# Patient Record
Sex: FEMALE | Race: WHITE | URBAN - METROPOLITAN AREA
[De-identification: names, ages, dates, MRNs, and addresses within clinical notes are randomized per-mention and may not be internally consistent; named-entity substitution may affect disease eponyms.]

---

## 2019-01-08 LAB — PAP SMEAR, EXTERNAL: NEGATIVE

## 2020-11-10 ENCOUNTER — NURSE ONLY (OUTPATIENT)
Dept: PRIMARY CARE CLINIC | Age: 25
End: 2020-11-10

## 2020-11-10 PROCEDURE — 99211 OFF/OP EST MAY X REQ PHY/QHP: CPT | Performed by: NURSE PRACTITIONER

## 2020-11-12 LAB — SARS-COV-2, NAA: DETECTED

## 2020-11-25 ENCOUNTER — VIRTUAL VISIT (OUTPATIENT)
Dept: PRIMARY CARE CLINIC | Age: 25
End: 2020-11-25
Payer: COMMERCIAL

## 2020-11-25 PROCEDURE — 99203 OFFICE O/P NEW LOW 30 MIN: CPT | Performed by: FAMILY MEDICINE

## 2020-11-25 RX ORDER — FERROUS SULFATE 325(65) MG
325 TABLET ORAL
COMMUNITY
End: 2022-09-21

## 2020-11-25 RX ORDER — DROSPIRENONE AND ETHINYL ESTRADIOL 0.02-3(28)
1 KIT ORAL DAILY
COMMUNITY
Start: 2020-07-02 | End: 2022-08-29

## 2020-11-25 SDOH — HEALTH STABILITY: MENTAL HEALTH: HOW OFTEN DO YOU HAVE A DRINK CONTAINING ALCOHOL?: 2-4 TIMES A MONTH

## 2020-11-25 ASSESSMENT — ENCOUNTER SYMPTOMS
CONSTIPATION: 0
COUGH: 0
ABDOMINAL PAIN: 0
DIARRHEA: 0
EYE PAIN: 0

## 2020-11-25 NOTE — PROGRESS NOTES
2020    TELEHEALTH EVALUATION -- Audio/Visual (During UPBTJ-88 public health emergency)    HPI:    Tarah Ge (:  1995) has requested an audio/video evaluation for the following concern(s):    COVID detected on 11/10/2020. Asymptomatic now. Only SOB lingering when active. HR is jumping all over the place on her Apple Watch. She is watching it diligently. No associated symptoms with low or elevated HR. States that HR respond appropriately when low. Mentioned anxiety being a cause but did not speak about it much. Review of Systems   Constitutional: Negative for appetite change, chills, fatigue and fever. HENT: Negative for congestion. Eyes: Negative for pain and visual disturbance. Respiratory: Negative for cough. Cardiovascular: Positive for palpitations. Negative for chest pain. Gastrointestinal: Negative for abdominal pain, constipation and diarrhea. Genitourinary: Negative for difficulty urinating. Musculoskeletal: Negative for arthralgias. Skin: Negative for rash and wound. Neurological: Negative for dizziness, weakness, light-headedness and headaches. Hematological: Does not bruise/bleed easily. Psychiatric/Behavioral: Negative for behavioral problems. Prior to Visit Medications    Medication Sig Taking? Authorizing Provider   drospirenone-ethinyl estradiol (ADDISON) 3-0.02 MG per tablet Take 1 tablet by mouth daily Yes Historical Provider, MD   ferrous sulfate (IRON 325) 325 (65 Fe) MG tablet Take 325 mg by mouth daily (with breakfast) Yes Historical Provider, MD       Social History     Tobacco Use    Smoking status: Never Smoker   Substance Use Topics    Alcohol use: Yes     Frequency: 2-4 times a month    Drug use: No        No Known Allergies, History reviewed. No pertinent past medical history. ,   Past Surgical History:   Procedure Laterality Date    KNEE SURGERY  2012    left    KNEE SURGERY Right      and 2017    WISDOM TOOTH EXTRACTION     ,   Family History   Problem Relation Age of Onset    High Blood Pressure Mother     No Known Problems Father        PHYSICAL EXAMINATION:  [ INSTRUCTIONS:  \"[x]\" Indicates a positive item  \"[]\" Indicates a negative item  -- DELETE ALL ITEMS NOT EXAMINED]  [x] Alert  [x] Oriented to person/place/time    [x] No apparent distress  [] Toxic appearing    [] Face flushed appearing [x] Sclera clear  [] Lips are cyanotic      [x] Breathing appears normal  [] Appears tachypneic      [] Rash on visible skin    [x] Cranial Nerves II-XII grossly intact    [] Motor grossly intact in visible upper extremities    [] Motor grossly intact in visible lower extremities    [x] Normal Mood  [x] Anxious appearing    [x] Depressed appearing  [] Confused appearing      Due to this being a TeleHealth encounter, evaluation of the following organ systems is limited: Vitals/Constitutional/EENT/Resp/CV/GI//MS/Neuro/Skin/Heme-Lymph-Imm. ASSESSMENT/PLAN:  1. Encounter to establish care  All questions answered. F/u discussed. Healthy lifestyle modifications discussed. 2. Iron deficiency anemia, unspecified iron deficiency anemia type  Taking iron daily  Was told that she was low on iron in the past and recently started supplementing without follow up. Will update labs and treat accordingly. - Ferritin; Future  - Iron and TIBC; Future  - Transferrin; Future  - CBC Auto Differential; Future  - Comprehensive Metabolic Panel; Future    3. Increased heart rate  Continue to monitor. If anxiety is playing a lg part, will follow up to speak about this   Denies assoc symptoms. No follow-ups on file. No future appointments. An  electronic signature was used to authenticate this note. --Luis Fernando Delgadillo MD on 11/25/2020 at 6:35 PM    {Coding Help -- Use CPT 40616-22770 with EM elements or Time rules for Office Visits. :    16-20 or more minutes were spent on the digital evaluation and management of this patient. Pursuant to the emergency declaration under the [facility], [facility] waiver authority and the [facility] and [employer] Act, this Virtual  Visit was conducted, with patient's consent, to reduce the patient's risk of exposure to COVID-19 and provide continuity of care for an established patient. Services were provided through a video synchronous discussion virtually to substitute for in-person clinic visit.

## 2020-12-03 DIAGNOSIS — D50.9 IRON DEFICIENCY ANEMIA, UNSPECIFIED IRON DEFICIENCY ANEMIA TYPE: ICD-10-CM

## 2020-12-03 LAB
A/G RATIO: 1.9 (ref 1.1–2.2)
ALBUMIN SERPL-MCNC: 4.9 G/DL (ref 3.4–5)
ALP BLD-CCNC: 40 U/L (ref 40–129)
ALT SERPL-CCNC: 8 U/L (ref 10–40)
ANION GAP SERPL CALCULATED.3IONS-SCNC: 13 MMOL/L (ref 3–16)
AST SERPL-CCNC: 17 U/L (ref 15–37)
BASOPHILS ABSOLUTE: 0 K/UL (ref 0–0.2)
BASOPHILS RELATIVE PERCENT: 0.5 %
BILIRUB SERPL-MCNC: 0.8 MG/DL (ref 0–1)
BUN BLDV-MCNC: 8 MG/DL (ref 7–20)
CALCIUM SERPL-MCNC: 9.7 MG/DL (ref 8.3–10.6)
CHLORIDE BLD-SCNC: 99 MMOL/L (ref 99–110)
CO2: 25 MMOL/L (ref 21–32)
CREAT SERPL-MCNC: 0.6 MG/DL (ref 0.6–1.1)
EOSINOPHILS ABSOLUTE: 0 K/UL (ref 0–0.6)
EOSINOPHILS RELATIVE PERCENT: 0.4 %
FERRITIN: 77.9 NG/ML (ref 15–150)
GFR AFRICAN AMERICAN: >60
GFR NON-AFRICAN AMERICAN: >60
GLOBULIN: 2.6 G/DL
GLUCOSE BLD-MCNC: 103 MG/DL (ref 70–99)
HCT VFR BLD CALC: 42.8 % (ref 36–48)
HEMOGLOBIN: 14.8 G/DL (ref 12–16)
IRON SATURATION: 46 % (ref 15–50)
IRON: 169 UG/DL (ref 37–145)
LYMPHOCYTES ABSOLUTE: 1.2 K/UL (ref 1–5.1)
LYMPHOCYTES RELATIVE PERCENT: 19.5 %
MCH RBC QN AUTO: 34 PG (ref 26–34)
MCHC RBC AUTO-ENTMCNC: 34.6 G/DL (ref 31–36)
MCV RBC AUTO: 98 FL (ref 80–100)
MONOCYTES ABSOLUTE: 0.4 K/UL (ref 0–1.3)
MONOCYTES RELATIVE PERCENT: 5.9 %
NEUTROPHILS ABSOLUTE: 4.7 K/UL (ref 1.7–7.7)
NEUTROPHILS RELATIVE PERCENT: 73.7 %
PDW BLD-RTO: 11.5 % (ref 12.4–15.4)
PLATELET # BLD: 247 K/UL (ref 135–450)
PMV BLD AUTO: 8.1 FL (ref 5–10.5)
POTASSIUM SERPL-SCNC: 4.1 MMOL/L (ref 3.5–5.1)
RBC # BLD: 4.36 M/UL (ref 4–5.2)
SODIUM BLD-SCNC: 137 MMOL/L (ref 136–145)
TOTAL IRON BINDING CAPACITY: 370 UG/DL (ref 260–445)
TOTAL PROTEIN: 7.5 G/DL (ref 6.4–8.2)
TRANSFERRIN: 318 MG/DL (ref 200–360)
WBC # BLD: 6.4 K/UL (ref 4–11)

## 2021-12-02 ENCOUNTER — E-VISIT (OUTPATIENT)
Dept: FAMILY MEDICINE CLINIC | Age: 26
End: 2021-12-02
Payer: COMMERCIAL

## 2021-12-02 DIAGNOSIS — L50.9 URTICARIA: Primary | ICD-10-CM

## 2021-12-02 PROCEDURE — 99422 OL DIG E/M SVC 11-20 MIN: CPT | Performed by: FAMILY MEDICINE

## 2021-12-02 RX ORDER — FAMOTIDINE 40 MG/1
40 TABLET, FILM COATED ORAL EVERY EVENING
Qty: 30 TABLET | Refills: 0 | Status: SHIPPED | OUTPATIENT
Start: 2021-12-02 | End: 2022-08-29 | Stop reason: ALTCHOICE

## 2021-12-02 RX ORDER — PREDNISONE 20 MG/1
40 TABLET ORAL DAILY
Qty: 10 TABLET | Refills: 0 | Status: SHIPPED | OUTPATIENT
Start: 2021-12-02 | End: 2021-12-07

## 2021-12-02 RX ORDER — HYDROXYZINE HYDROCHLORIDE 25 MG/1
25 TABLET, FILM COATED ORAL EVERY 8 HOURS PRN
Qty: 30 TABLET | Refills: 0 | Status: SHIPPED | OUTPATIENT
Start: 2021-12-02 | End: 2021-12-12

## 2021-12-02 NOTE — PROGRESS NOTES
The above-named patient has requested evaluation and management services through an electronic visit by way of WeShow powered by [employer] and provided by [facility] [place]. The history of present illness provided by the patient as well as medications, allergies, past medical history have been reviewed in this electronic health record. Following evaluation and management recommendations have been made and communicated to the patient via WeShow:    Kuldip Napoles. I am sorry you are having issues with this rash. It could be related to exposure or contact to an allergen or irritant. Or it could be occurring spontaneously. I have prescribed prednisone, famotidine and a strong antihistamine. In the meantime, please schedule an appointment with your physician. I believe you may need to see an allergist.    Diagnoses and all orders for this visit:    Urticaria  Comments:  See above. Orders:  -     hydrOXYzine (ATARAX) 25 MG tablet; Take 1 tablet by mouth every 8 hours as needed for Itching  -     predniSONE (DELTASONE) 20 MG tablet; Take 2 tablets by mouth daily for 5 days  -     famotidine (PEPCID) 40 MG tablet; Take 1 tablet by mouth every evening      11-20 minutes were spent on the digital evaluation and management of this patient.

## 2021-12-06 ENCOUNTER — VIRTUAL VISIT (OUTPATIENT)
Dept: PRIMARY CARE CLINIC | Age: 26
End: 2021-12-06
Payer: COMMERCIAL

## 2021-12-06 DIAGNOSIS — R53.83 OTHER FATIGUE: ICD-10-CM

## 2021-12-06 DIAGNOSIS — Z86.2 HISTORY OF ANEMIA: ICD-10-CM

## 2021-12-06 DIAGNOSIS — L50.9 HIVES: Primary | ICD-10-CM

## 2021-12-06 LAB
BASOPHILS ABSOLUTE: 0 K/UL (ref 0–0.2)
BASOPHILS RELATIVE PERCENT: 0.2 %
EOSINOPHILS ABSOLUTE: 0 K/UL (ref 0–0.6)
EOSINOPHILS RELATIVE PERCENT: 0 %
HCT VFR BLD CALC: 41 % (ref 36–48)
HEMOGLOBIN: 13.9 G/DL (ref 12–16)
LYMPHOCYTES ABSOLUTE: 1.3 K/UL (ref 1–5.1)
LYMPHOCYTES RELATIVE PERCENT: 15.3 %
MCH RBC QN AUTO: 33.7 PG (ref 26–34)
MCHC RBC AUTO-ENTMCNC: 34 G/DL (ref 31–36)
MCV RBC AUTO: 99.2 FL (ref 80–100)
MONOCYTES ABSOLUTE: 0.5 K/UL (ref 0–1.3)
MONOCYTES RELATIVE PERCENT: 5.8 %
NEUTROPHILS ABSOLUTE: 6.7 K/UL (ref 1.7–7.7)
NEUTROPHILS RELATIVE PERCENT: 78.7 %
PDW BLD-RTO: 12.1 % (ref 12.4–15.4)
PLATELET # BLD: 274 K/UL (ref 135–450)
PMV BLD AUTO: 7.9 FL (ref 5–10.5)
RBC # BLD: 4.13 M/UL (ref 4–5.2)
WBC # BLD: 8.5 K/UL (ref 4–11)

## 2021-12-06 PROCEDURE — 99214 OFFICE O/P EST MOD 30 MIN: CPT | Performed by: FAMILY MEDICINE

## 2021-12-06 PROCEDURE — VIRTUALHLTH VIRTUAL HEALTH SAME DAY: Performed by: FAMILY MEDICINE

## 2021-12-06 RX ORDER — CETIRIZINE HYDROCHLORIDE 10 MG/1
10 TABLET ORAL DAILY
Qty: 90 TABLET | Refills: 1 | Status: SHIPPED | OUTPATIENT
Start: 2021-12-06 | End: 2022-08-29 | Stop reason: SDUPTHER

## 2021-12-06 ASSESSMENT — ENCOUNTER SYMPTOMS
SHORTNESS OF BREATH: 0
DIARRHEA: 0
CONSTIPATION: 0
EYE PAIN: 0
ABDOMINAL PAIN: 0
COUGH: 0

## 2021-12-06 ASSESSMENT — PATIENT HEALTH QUESTIONNAIRE - PHQ9
SUM OF ALL RESPONSES TO PHQ QUESTIONS 1-9: 0
SUM OF ALL RESPONSES TO PHQ9 QUESTIONS 1 & 2: 0
1. LITTLE INTEREST OR PLEASURE IN DOING THINGS: 0
SUM OF ALL RESPONSES TO PHQ QUESTIONS 1-9: 0
SUM OF ALL RESPONSES TO PHQ QUESTIONS 1-9: 0
2. FEELING DOWN, DEPRESSED OR HOPELESS: 0

## 2021-12-06 NOTE — PROGRESS NOTES
2021    TELEHEALTH EVALUATION -- Audio/Visual (During HBOTX-84 public health emergency)    HPI:    Anusha Gale (:  1995) has requested an audio/video evaluation for the following concern(s):    Rash last week. Started on her neck and enveloped her cheeks and chest.  Was itchy, red and mildly raised. She describes them as large hives. E-visit completed and a DrAbdirahman Prescribed: Famotidine, Atarax and steroid  She did not have time to pick this up, the rash worsened and she ended up going to the urgent care. Told her to start taking the meds prescribed during a visit  After starting these medications, the rash dissipated over the weekend and now gone. Has not returned. She was wondering if it had anything to do with cleaning products at work or a new substance used. She has not introduced anything new into her daily routine at home. She has returned to work since and has not had any symptoms. Has been tired lately and in the setting of history of anemia, would like blood work updated. Review of Systems   Constitutional: Positive for fatigue. Negative for appetite change, chills and fever. HENT: Negative for congestion. Eyes: Negative for pain and visual disturbance. Respiratory: Negative for cough and shortness of breath. Cardiovascular: Negative for chest pain and palpitations. Gastrointestinal: Negative for abdominal pain, constipation and diarrhea. Genitourinary: Negative for difficulty urinating. Musculoskeletal: Negative for arthralgias. Skin: Positive for rash. Negative for wound. Neurological: Negative for dizziness, weakness, light-headedness and headaches. Hematological: Does not bruise/bleed easily. Psychiatric/Behavioral: Negative for behavioral problems. Prior to Visit Medications    Medication Sig Taking?  Authorizing Provider   cetirizine (ZYRTEC) 10 MG tablet Take 1 tablet by mouth daily Yes Analy Moran MD   hydrOXYzine (ATARAX) 25 MG tablet Take 1 tablet by mouth every 8 hours as needed for Itching Yes Nghia Reyna MD   predniSONE (DELTASONE) 20 MG tablet Take 2 tablets by mouth daily for 5 days Yes Nghia Reyna MD   famotidine (PEPCID) 40 MG tablet Take 1 tablet by mouth every evening Yes Nghia Reyna MD   drospirenone-ethinyl estradiol (ADDISON) 3-0.02 MG per tablet Take 1 tablet by mouth daily Yes Historical Provider, MD   ferrous sulfate (IRON 325) 325 (65 Fe) MG tablet Take 325 mg by mouth daily (with breakfast) Yes Historical Provider, MD       Social History     Tobacco Use    Smoking status: Never Smoker    Smokeless tobacco: Never Used   Vaping Use    Vaping Use: Never used   Substance Use Topics    Alcohol use: Yes    Drug use: No        No Known Allergies, History reviewed. No pertinent past medical history. ,   Past Surgical History:   Procedure Laterality Date    KNEE SURGERY  01/01/2012    left    KNEE SURGERY Right     2015 and 2017    WISDOM TOOTH EXTRACTION     ,   Social History     Tobacco Use    Smoking status: Never Smoker    Smokeless tobacco: Never Used   Vaping Use    Vaping Use: Never used   Substance Use Topics    Alcohol use:  Yes    Drug use: No   ,   Family History   Problem Relation Age of Onset    High Blood Pressure Mother     No Known Problems Father    ,   Immunization History   Administered Date(s) Administered    COVID-19, Helena Hansen, Primary or Immunocompromised, PF, 100mcg/0.5mL 12/29/2020, 01/26/2021    Influenza, Quadv, IM, PF (6 mo and older Fluzone, Flulaval, Fluarix, and 3 yrs and older Afluria) 09/05/2017, 08/17/2018    Influenza, Patt Cuna, Recombinant, IM PF (Flublok 18 yrs and older) 10/16/2019    PPD Test 09/06/2017, 09/13/2017, 08/14/2018, 08/27/2018   ,   Health Maintenance   Topic Date Due    Hepatitis C screen  Never done    Varicella vaccine (1 of 2 - 2-dose childhood series) Never done    HPV vaccine (1 - 2-dose series) Never done    HIV screen  Never done    DTaP/Tdap/Td vaccine (1 - Tdap) Never done    Pap smear  Never done    COVID-19 Vaccine (3 - Booster for Moderna series) 07/26/2021    Flu vaccine (1) 09/01/2021    Hepatitis A vaccine  Aged Out    Hepatitis B vaccine  Aged Out    Hib vaccine  Aged Out    Meningococcal (ACWY) vaccine  Aged Out    Pneumococcal 0-64 years Vaccine  Aged Out       PHYSICAL EXAMINATION:  [ INSTRUCTIONS:  \"[x]\" Indicates a positive item  \"[]\" Indicates a negative item  -- DELETE ALL ITEMS NOT EXAMINED]  [x] Alert  [x] Oriented to person/place/time    [x] No apparent distress  [] Toxic appearing    [] Face flushed appearing [x] Sclera clear  [] Lips are cyanotic      [x] Breathing appears normal  [] Appears tachypneic      [] Rash on visible skin    [x] Cranial Nerves II-XII grossly intact    [] Motor grossly intact in visible upper extremities    [] Motor grossly intact in visible lower extremities    [x] Normal Mood  [] Anxious appearing    [] Depressed appearing  [] Confused appearing      Due to this being a TeleHealth encounter, evaluation of the following organ systems is limited: Vitals/Constitutional/EENT/Resp/CV/GI//MS/Neuro/Skin/Heme-Lymph-Imm. ASSESSMENT/PLAN:  1. Hives  Referral placed to allergist per patient request  Recommended daily antihistamine; may take 20 mg for the first 2 weeks and then 10 mg daily every day thereafter. Follow-up in 4 to 6 weeks  - [facility] - Martin Gomez MD, Allergy & Immunology, [place]  - cetirizine (ZYRTEC) 10 MG tablet; Take 1 tablet by mouth daily  Dispense: 90 tablet; Refill: 1    2. Other fatigue  Update labs to rule out anemia, electrolyte abnormality, thyroid abnormality. Sleeping well and hydrating well. States she can probably eat more healthfully  Follow-up labs and treat accordingly  - CBC Auto Differential; Future  - Comprehensive Metabolic Panel; Future  - TSH with Reflex; Future  - Ferritin; Future  - Iron and TIBC;  Future  - Transferrin; Future    3. History of anemia  As above      No follow-ups on file. No future appointments. An  electronic signature was used to authenticate this note. --Laura Gooden MD on 12/6/2021 at 5:14 PM    {Coding Help -- Use CPT 13448-04212 with EM elements or Time rules for Office Visits. :    >20 minutes were spent on the digital evaluation and management of this patient. Pursuant to the emergency declaration under the [address] [place] [address], [facility] waiver authority and the [employer] and [employer] Act, this Virtual  Visit was conducted, with patient's consent, to reduce the patient's risk of exposure to COVID-19 and provide continuity of care for an established patient. Services were provided through a video synchronous discussion virtually to substitute for in-person clinic visit.

## 2021-12-07 LAB
A/G RATIO: 1.8 (ref 1.1–2.2)
ALBUMIN SERPL-MCNC: 4.8 G/DL (ref 3.4–5)
ALP BLD-CCNC: 43 U/L (ref 40–129)
ALT SERPL-CCNC: 10 U/L (ref 10–40)
ANION GAP SERPL CALCULATED.3IONS-SCNC: 17 MMOL/L (ref 3–16)
AST SERPL-CCNC: 15 U/L (ref 15–37)
BILIRUB SERPL-MCNC: 0.4 MG/DL (ref 0–1)
BUN BLDV-MCNC: 11 MG/DL (ref 7–20)
CALCIUM SERPL-MCNC: 9.6 MG/DL (ref 8.3–10.6)
CHLORIDE BLD-SCNC: 99 MMOL/L (ref 99–110)
CO2: 24 MMOL/L (ref 21–32)
CREAT SERPL-MCNC: 0.6 MG/DL (ref 0.6–1.1)
FERRITIN: 32.5 NG/ML (ref 15–150)
GFR AFRICAN AMERICAN: >60
GFR NON-AFRICAN AMERICAN: >60
GLUCOSE BLD-MCNC: 106 MG/DL (ref 70–99)
IRON SATURATION: 19 % (ref 15–50)
IRON: 81 UG/DL (ref 37–145)
POTASSIUM SERPL-SCNC: 4 MMOL/L (ref 3.5–5.1)
SODIUM BLD-SCNC: 140 MMOL/L (ref 136–145)
TOTAL IRON BINDING CAPACITY: 418 UG/DL (ref 260–445)
TOTAL PROTEIN: 7.5 G/DL (ref 6.4–8.2)
TRANSFERRIN: 368 MG/DL (ref 200–360)
TSH REFLEX: 1.42 UIU/ML (ref 0.27–4.2)

## 2022-08-15 ENCOUNTER — PATIENT MESSAGE (OUTPATIENT)
Dept: PRIMARY CARE CLINIC | Age: 27
End: 2022-08-15

## 2022-08-15 DIAGNOSIS — L50.9 URTICARIA: Primary | ICD-10-CM

## 2022-08-15 DIAGNOSIS — R20.3 SENSITIVE SKIN: ICD-10-CM

## 2022-08-29 ENCOUNTER — OFFICE VISIT (OUTPATIENT)
Dept: DERMATOLOGY | Age: 27
End: 2022-08-29
Payer: COMMERCIAL

## 2022-08-29 DIAGNOSIS — L50.8 CHRONIC URTICARIA: ICD-10-CM

## 2022-08-29 DIAGNOSIS — L24.9 IRRITANT CONTACT DERMATITIS, UNSPECIFIED TRIGGER: Primary | ICD-10-CM

## 2022-08-29 PROCEDURE — 99204 OFFICE O/P NEW MOD 45 MIN: CPT | Performed by: INTERNAL MEDICINE

## 2022-08-29 RX ORDER — HYDROXYZINE HYDROCHLORIDE 10 MG/1
TABLET, FILM COATED ORAL
Qty: 30 TABLET | Refills: 1 | Status: SHIPPED | OUTPATIENT
Start: 2022-08-29 | End: 2022-09-23 | Stop reason: SDUPTHER

## 2022-08-29 RX ORDER — CETIRIZINE HYDROCHLORIDE 10 MG/1
TABLET ORAL
Qty: 120 TABLET | Refills: 1 | Status: SHIPPED | OUTPATIENT
Start: 2022-08-29 | End: 2022-09-22

## 2022-08-29 RX ORDER — DROSPIRENONE AND ETHINYL ESTRADIOL 0.02-3(28)
KIT ORAL
COMMUNITY

## 2022-08-29 NOTE — PATIENT INSTRUCTIONS
Thank you for visiting [facility] today! Please follow the instructions below as we discussed in clinic:    1) Start taking Zyrtec (cetirizine) 10mg daily. Increase to 10mg twice a day for next week. If you're still itchy, increase to 10mg 3 times a day for a week. If you're still itchy, increase to max of 20mg in morning and 20mg at night. 2) Continue hydrocortisone cream as you need  3) Continue hydroxyzine 25mg nightly as needed for itch  4) You have prominent dermatographism    Urticaria (Hives)    Hives are a common skin condition, usually characterized by pink swollen welts that develop, move and fade within several hours. They are usually itchy. Individual welts can be circular, annular or irregularly shaped. Lesions can also arise at sites of pressure or scratching, a condition called \"dermatographism\". In most cases, a cause can not be identified, and the condition goes away as mysteriously as it starts. The most common identifiable triggers are exposure to sun, heat, cold or water. Having an illness like a cold, flu, Strep throat, or a urinary tract infection can also trigger hives. Less commonly, foods will cause hives. Hives can be categorized by the duration of the problem:  Acute - welts may come and go within several hours, but the total duration of the rash lasts 2 weeks or less. Extensive evaluation almost never identifies a cause that can be easily treated or eliminated. Chronic urticaria - welts may come and go within several hours, and the total duration of the rash lasts more than 6 weeks. In half the cases, extensive evaluation may identify a cause that can be easily eliminated or treated. For more information, the American Academy of Dermatology has developed a Chronic Urticaria charles \"AAD Hives\".   c:

## 2022-08-29 NOTE — PROGRESS NOTES
[facility]  Megha Blackmon MD  [phone]    Date of Visit: 8/29/2022    Nusrat Arthur is a 33 y.o. female who presents for chronic rash. New pt    Chief Complaint:   Chief Complaint   Patient presents with    Rash     Abdomen, worse on lower rt leg, both legs         History of Present Illness:    Concern:  Rash since after getting COVID booster vaccine (11 days after)  Location: Started on arms, back-->spread to abdomen/legs more recently  Duration:  November 2021 it started, got better in Spring 2022, but has slowly started to recur over last several months  Symptoms: Itchy; rash comes and goes on different body parts but most prominently on lower body now   Exacerbating factors: Heat makes worse, hot showers, sweating   Previous treatments:    -Steroids + hydroxyzine at the beginning--hydroxyzine made too sedating  -Famotidine in AM--didn't really take it  Current treatments:  -Cetirizine 10mg nightly  -HC cream BID PRN  Effect of current treatment: Above help, but still getting new areas  Other history:  Seen by Dr. Lucho Garibay (Allergy/Immuno) who thought hives were 2/2 COVID vaccine most likely. Offered allergy prick testing, but thought wouldn't be as helpful. Can't see notes in chart. Labs from 12/2021 including iron panel, TSH, CBC, CMP WNL    *Personal history of skin cancer: None  *Family history of skin cancer: None    Review of Systems:  Gen: Feels well, good sense of health. Skin: No new or changing moles, no history of keloids or hypertrophic scars. Past Medical History, Family History, Surgical History, Medications and Allergies reviewed. History reviewed. No pertinent past medical history.   Past Surgical History:   Procedure Laterality Date    KNEE SURGERY  01/01/2012    left    KNEE SURGERY Right     2015 and 2017    WISDOM TOOTH EXTRACTION         No Known Allergies  Outpatient Medications Marked as Taking for the 8/29/22 encounter (Office Visit) with Adia Powell Davis Fuchs MD   Medication Sig Dispense Refill    drospirenone-ethinyl estradiol (ADDISON) 3-0.02 MG per tablet       cetirizine (ZYRTEC) 10 MG tablet Take up to 4 tablets daily for chronic urticaria 120 tablet 1    hydrOXYzine HCl (ATARAX) 10 MG tablet Take 1 tablet nightly PRN for itch 30 tablet 1    ferrous sulfate (IRON 325) 325 (65 Fe) MG tablet Take 325 mg by mouth daily (with breakfast)           Physical Examination   No acute distress. Mood clear/affect appropriate. Alert and oriented. Mucous membranes moist.  Sclera anicteric. Limited body skin exam was conducted to include the scalp, face (around mask) neck, chest, abdomen, back, right and left hands and forearms, right and left leg and feet and was normal with the following exceptions:   + Dermatographism  -Wheals on abdomen, L thigh  -Perifollicular erythema on R lower leg       Assessment and Plan     1. Chronic urticaria, diffuse body involvement--not controlled since Nov 2021  -Reviewed etiology, prognosis, trmt option. Pt has had recurrent hives for > 6 weeks with 2-3 flares per week or more. Unclear trigger, but has not maximized anti-histamines  -Reviewed cause of acute/chronic urticaria is more often idiopathic   -Recommend:  -Start cetirizine (ZYRTEC) 10 MG tablet; Take up to 4 tablets daily for chronic urticaria  Dispense: 120 tablet; Refill: 1 (recommend increase by 10mg every week if still itchy with max dose of 40mg daily)  - Cont hydrOXYzine HCl (ATARAX) 10 MG tablet; Take 1 tablet nightly PRN for itch  Dispense: 30 tablet; Refill: 1  -Cont OTC HC cream BID PRN for no more than 15 days per month to 1 area    -Future considerations: Autoimmune lab work up next visit     2. Irritant contact dermatitis, unspecified trigger, R lower leg  Favor razor burn/ICD=separate process from above  -Can apply HC cream BID PRN to area + change out razor blade    Return in about 7 weeks (around 10/17/2022).     Note is transcribed using voice recognition software. Inadvertent computerized transcription errors may be present.     Airam Rosales MD

## 2022-09-19 ENCOUNTER — PATIENT MESSAGE (OUTPATIENT)
Dept: DERMATOLOGY | Age: 27
End: 2022-09-19

## 2022-09-21 ENCOUNTER — OFFICE VISIT (OUTPATIENT)
Dept: PRIMARY CARE CLINIC | Age: 27
End: 2022-09-21
Payer: COMMERCIAL

## 2022-09-21 VITALS
WEIGHT: 127.2 LBS | DIASTOLIC BLOOD PRESSURE: 74 MMHG | HEART RATE: 79 BPM | HEIGHT: 65 IN | BODY MASS INDEX: 21.19 KG/M2 | SYSTOLIC BLOOD PRESSURE: 126 MMHG

## 2022-09-21 DIAGNOSIS — Z00.00 ENCOUNTER FOR WELL ADULT EXAM WITHOUT ABNORMAL FINDINGS: Primary | ICD-10-CM

## 2022-09-21 DIAGNOSIS — D50.9 IRON DEFICIENCY ANEMIA, UNSPECIFIED IRON DEFICIENCY ANEMIA TYPE: ICD-10-CM

## 2022-09-21 PROCEDURE — 99395 PREV VISIT EST AGE 18-39: CPT | Performed by: FAMILY MEDICINE

## 2022-09-21 SDOH — ECONOMIC STABILITY: FOOD INSECURITY: WITHIN THE PAST 12 MONTHS, YOU WORRIED THAT YOUR FOOD WOULD RUN OUT BEFORE YOU GOT MONEY TO BUY MORE.: NEVER TRUE

## 2022-09-21 SDOH — ECONOMIC STABILITY: FOOD INSECURITY: WITHIN THE PAST 12 MONTHS, THE FOOD YOU BOUGHT JUST DIDN'T LAST AND YOU DIDN'T HAVE MONEY TO GET MORE.: NEVER TRUE

## 2022-09-21 ASSESSMENT — PATIENT HEALTH QUESTIONNAIRE - PHQ9
2. FEELING DOWN, DEPRESSED OR HOPELESS: 0
SUM OF ALL RESPONSES TO PHQ QUESTIONS 1-9: 0
SUM OF ALL RESPONSES TO PHQ9 QUESTIONS 1 & 2: 0
1. LITTLE INTEREST OR PLEASURE IN DOING THINGS: 0
SUM OF ALL RESPONSES TO PHQ QUESTIONS 1-9: 0

## 2022-09-21 ASSESSMENT — ENCOUNTER SYMPTOMS
CONSTIPATION: 0
SHORTNESS OF BREATH: 0
ABDOMINAL PAIN: 0
DIARRHEA: 0
EYE PAIN: 0
COUGH: 0

## 2022-09-21 ASSESSMENT — SOCIAL DETERMINANTS OF HEALTH (SDOH): HOW HARD IS IT FOR YOU TO PAY FOR THE VERY BASICS LIKE FOOD, HOUSING, MEDICAL CARE, AND HEATING?: NOT HARD AT ALL

## 2022-09-21 NOTE — PATIENT INSTRUCTIONS
Well Visit, Ages 25 to 72: Care Instructions  Well visits can help you stay healthy. Your doctor has checked your overall health and may have suggested ways to take good care of yourself. Your doctor also may have recommended tests. You can help prevent illness with healthy eating, good sleep, vaccinations, regular exercise, and other steps. Get the tests that you and your doctor decide on. Depending on your age and risks, examples might include screening for diabetes; hepatitis C; HIV; and cervical, breast, lung, and colon cancer. Screening helps find diseases before any symptoms appear. Eat healthy foods. Choose fruits, vegetables, whole grains, lean protein, and low-fat dairy foods. Limit saturated fat and reduce salt. Limit alcohol. Men should have no more than 2 drinks a day. Women should have no more than 1. For some people, no alcohol is the best choice. Exercise. Get at least 30 minutes of exercise on most days of the week. Walking can be a good choice. Reach and stay at your healthy weight. This will lower your risk for many health problems. Take care of your mental health. Try to stay connected with friends, family, and community, and find ways to manage stress. If you're feeling depressed or hopeless, talk to someone. A counselor can help. If you don't have a counselor, talk to your doctor. Talk to your doctor if you think you may have a problem with alcohol or drug use. This includes prescription medicines and illegal drugs. Avoid tobacco and nicotine: Don't smoke, vape, or chew. If you need help quitting, talk to your doctor. Practice safer sex. Getting tested, using condoms or dental dams, and limiting sex partners can help prevent STIs. Use birth control if it's important to you to prevent pregnancy. Talk with your doctor about your choices and what might be best for you. Prevent problems where you can.  Protect your skin from too much sun, wash your hands, brush your teeth twice a day, and wear a seat belt in the car. Where can you learn more? Go to [email or web address]://[email or web address].[email or web address] org and sign in to your [employer] account. Enter P072 in the [facility] box to learn more about \"Well Visit, Ages 25 to 72: Care Instructions. \"     If you do not have an account, please click on the \"Sign Up Now\" link. Current as of: March 9, 2022               Content Version: 13.4  © 4460-6665 [employer], [employer]. Care instructions adapted under license by [facility] ([facility]). If you have questions about a medical condition or this instruction, always ask your healthcare professional. [address] any warranty or liability for your use of this information.

## 2022-09-21 NOTE — PROGRESS NOTES
of wine per week    Drug use: No       New Prescriptions    No medications on file       Meds Prior to visit:  Current Outpatient Medications on File Prior to Visit   Medication Sig Dispense Refill    drospirenone-ethinyl estradiol (ADDISON) 3-0.02 MG per tablet       cetirizine (ZYRTEC) 10 MG tablet Take up to 4 tablets daily for chronic urticaria 120 tablet 1    hydrOXYzine HCl (ATARAX) 10 MG tablet Take 1 tablet nightly PRN for itch 30 tablet 1     No current facility-administered medications on file prior to visit. No Known Allergies    OBJECTIVE:  /74   Pulse 79   Ht 5' 5\" (1.651 m)   Wt 127 lb 3.2 oz (57.7 kg)   LMP 08/26/2022   BMI 21.17 kg/m²   BP Readings from Last 2 Encounters:   09/21/22 126/74     Wt Readings from Last 3 Encounters:   09/21/22 127 lb 3.2 oz (57.7 kg)       Physical Exam  Vitals reviewed. Constitutional:       General: She is not in acute distress. Appearance: Normal appearance. She is well-developed and normal weight. HENT:      Head: Normocephalic and atraumatic. Right Ear: Tympanic membrane, ear canal and external ear normal. There is no impacted cerumen. Left Ear: Tympanic membrane, ear canal and external ear normal. There is no impacted cerumen. Nose: Nose normal.      Mouth/Throat:      Mouth: Mucous membranes are moist.      Pharynx: Oropharynx is clear. No oropharyngeal exudate or posterior oropharyngeal erythema. Eyes:      General: No scleral icterus. Right eye: No discharge. Left eye: No discharge. Extraocular Movements: Extraocular movements intact. Conjunctiva/sclera: Conjunctivae normal.      Pupils: Pupils are equal, round, and reactive to light. Cardiovascular:      Rate and Rhythm: Normal rate and regular rhythm. Pulses: Normal pulses. Heart sounds: Normal heart sounds. No murmur heard.      Comments: Radial and pedal pulses intact  Pulmonary:      Effort: Pulmonary effort is normal. No respiratory distress. Breath sounds: Normal breath sounds. No wheezing or rales. Chest:      Chest wall: No tenderness. Abdominal:      General: Bowel sounds are normal.      Palpations: Abdomen is soft. There is no mass. Tenderness: There is no abdominal tenderness. There is no guarding or rebound. Hernia: No hernia is present. Comments: Normal liver and spleen. No organomegaly   Musculoskeletal:         General: No tenderness. Normal range of motion. Cervical back: Normal range of motion and neck supple. Comments: Intact in all extremities   Lymphadenopathy:      Cervical: No cervical adenopathy. Skin:     General: Skin is warm. Findings: No erythema or rash. Neurological:      General: No focal deficit present. Mental Status: She is alert and oriented to person, place, and time. Mental status is at baseline. Motor: No weakness or abnormal muscle tone. Coordination: Coordination normal.      Gait: Gait normal.      Deep Tendon Reflexes: Reflexes normal.   Psychiatric:         Mood and Affect: Mood normal.         Behavior: Behavior normal.         Thought Content: Thought content normal.         Judgment: Judgment normal.       Lab Results   Component Value Date    WBC 8.5 12/06/2021    HGB 13.9 12/06/2021    HCT 41.0 12/06/2021    MCV 99.2 12/06/2021     12/06/2021     Lab Results   Component Value Date     12/06/2021    K 4.0 12/06/2021    CL 99 12/06/2021    CO2 24 12/06/2021    BUN 11 12/06/2021    CREATININE 0.6 12/06/2021    GLUCOSE 106 (H) 12/06/2021    CALCIUM 9.6 12/06/2021    PROT 7.5 12/06/2021    LABALBU 4.8 12/06/2021    BILITOT 0.4 12/06/2021    ALKPHOS 43 12/06/2021    AST 15 12/06/2021    ALT 10 12/06/2021    LABGLOM >60 12/06/2021    GFRAA >60 12/06/2021    AGRATIO 1.8 12/06/2021    GLOB 2.6 12/03/2020       Discussed use, benefit, and side effects of prescribed medications. Barriers to medication compliance addressed.   All patient questions answered. Pt voiced understanding. RTC Return if symptoms worsen or fail to improve.     Future Appointments   Date Time Provider Val Strickland   10/10/2022  3:45 PM MD Ghulam Crespo MD  9/21/2022  4:49 PM

## 2022-09-22 DIAGNOSIS — D50.9 IRON DEFICIENCY ANEMIA, UNSPECIFIED IRON DEFICIENCY ANEMIA TYPE: ICD-10-CM

## 2022-09-22 DIAGNOSIS — Z00.00 ENCOUNTER FOR WELL ADULT EXAM WITHOUT ABNORMAL FINDINGS: ICD-10-CM

## 2022-09-22 DIAGNOSIS — L50.8 CHRONIC URTICARIA: ICD-10-CM

## 2022-09-22 LAB
A/G RATIO: 1.7 (ref 1.1–2.2)
ALBUMIN SERPL-MCNC: 4.5 G/DL (ref 3.4–5)
ALP BLD-CCNC: 48 U/L (ref 40–129)
ALT SERPL-CCNC: 13 U/L (ref 10–40)
ANION GAP SERPL CALCULATED.3IONS-SCNC: 14 MMOL/L (ref 3–16)
AST SERPL-CCNC: 21 U/L (ref 15–37)
BASOPHILS ABSOLUTE: 0 K/UL (ref 0–0.2)
BASOPHILS RELATIVE PERCENT: 0.5 %
BILIRUB SERPL-MCNC: 0.6 MG/DL (ref 0–1)
BUN BLDV-MCNC: 7 MG/DL (ref 7–20)
CALCIUM SERPL-MCNC: 9.4 MG/DL (ref 8.3–10.6)
CHLORIDE BLD-SCNC: 98 MMOL/L (ref 99–110)
CHOLESTEROL, TOTAL: 198 MG/DL (ref 0–199)
CO2: 24 MMOL/L (ref 21–32)
CREAT SERPL-MCNC: 0.7 MG/DL (ref 0.6–1.1)
EOSINOPHILS ABSOLUTE: 0 K/UL (ref 0–0.6)
EOSINOPHILS RELATIVE PERCENT: 0.9 %
GFR AFRICAN AMERICAN: >60
GFR NON-AFRICAN AMERICAN: >60
GLUCOSE BLD-MCNC: 85 MG/DL (ref 70–99)
HCT VFR BLD CALC: 38.4 % (ref 36–48)
HDLC SERPL-MCNC: 72 MG/DL (ref 40–60)
HEMOGLOBIN: 13 G/DL (ref 12–16)
LDL CHOLESTEROL CALCULATED: 108 MG/DL
LYMPHOCYTES ABSOLUTE: 1.8 K/UL (ref 1–5.1)
LYMPHOCYTES RELATIVE PERCENT: 34 %
MCH RBC QN AUTO: 32.7 PG (ref 26–34)
MCHC RBC AUTO-ENTMCNC: 34 G/DL (ref 31–36)
MCV RBC AUTO: 96.4 FL (ref 80–100)
MONOCYTES ABSOLUTE: 0.4 K/UL (ref 0–1.3)
MONOCYTES RELATIVE PERCENT: 8.4 %
NEUTROPHILS ABSOLUTE: 3 K/UL (ref 1.7–7.7)
NEUTROPHILS RELATIVE PERCENT: 56.2 %
PDW BLD-RTO: 13 % (ref 12.4–15.4)
PLATELET # BLD: 267 K/UL (ref 135–450)
PMV BLD AUTO: 7.8 FL (ref 5–10.5)
POTASSIUM SERPL-SCNC: 3.9 MMOL/L (ref 3.5–5.1)
RBC # BLD: 3.98 M/UL (ref 4–5.2)
SODIUM BLD-SCNC: 136 MMOL/L (ref 136–145)
TOTAL PROTEIN: 7.2 G/DL (ref 6.4–8.2)
TRIGL SERPL-MCNC: 91 MG/DL (ref 0–150)
VLDLC SERPL CALC-MCNC: 18 MG/DL
WBC # BLD: 5.3 K/UL (ref 4–11)

## 2022-09-22 RX ORDER — CETIRIZINE HYDROCHLORIDE 10 MG/1
TABLET ORAL
Qty: 120 TABLET | Refills: 1 | Status: SHIPPED | OUTPATIENT
Start: 2022-09-22 | End: 2022-10-21 | Stop reason: SDUPTHER

## 2022-09-23 ENCOUNTER — TELEPHONE (OUTPATIENT)
Dept: DERMATOLOGY | Age: 27
End: 2022-09-23

## 2022-09-23 RX ORDER — HYDROXYZINE HYDROCHLORIDE 10 MG/1
TABLET, FILM COATED ORAL
Qty: 30 TABLET | Refills: 0 | Status: SHIPPED | OUTPATIENT
Start: 2022-09-23 | End: 2022-10-21 | Stop reason: SDUPTHER

## 2022-09-23 NOTE — TELEPHONE ENCOUNTER
Called pt. [phone]  [facility] to call office  A PA is need for medication hydroxyzine and we are needing you prescription card information.   Please return call to office  Thanks Tessie Oliveira

## 2022-09-27 NOTE — TELEPHONE ENCOUNTER
Called pt [phone]  [facility] to call office need prescription card information for medication hydroxyzine or checking to see if you had picked it up already with a good rx coupon   Please return call to office [phone]  Thanks Oniel Jefferson

## 2022-10-04 NOTE — TELEPHONE ENCOUNTER
Call #3   Called pt [phone]  To see if patient picked up medication from pharm with a good rx coupon

## 2022-10-10 ENCOUNTER — OFFICE VISIT (OUTPATIENT)
Dept: DERMATOLOGY | Age: 27
End: 2022-10-10
Payer: COMMERCIAL

## 2022-10-10 DIAGNOSIS — L50.8 CHRONIC URTICARIA: Primary | ICD-10-CM

## 2022-10-10 PROCEDURE — 99214 OFFICE O/P EST MOD 30 MIN: CPT | Performed by: INTERNAL MEDICINE

## 2022-10-10 NOTE — PROGRESS NOTES
[facility]  Cammie Bautista MD  [phone]    Date of Visit: 10/10/2022  LV 8/2022    Raya Burnett is a 33 y.o. female who presents for chronic rash. Chief Complaint:   Chief Complaint   Patient presents with    Rash     Follow up rash- gone now        History of Present Illness:    Concern: Chronic urticaria  Duration: Since after getting COVID booster vaccine (11 days after) in Nov 2021. Got better in Spring 2022, but has slowly started to recur over last several months  Location: Started on arms, back-->spread to abdomen/legs at last visit. Now only flaring on legs mostly  Symptoms: Itchy; rash comes and goes on different body parts but most prominently on lower body now   Exacerbating factors: Heat makes worse, hot showers, sweating   Previous treatments:    -Steroids + hydroxyzine at the beginning--hydroxyzine made too sedating  -Famotidine in AM--didn't really take it  Current treatments:  -Cetirizine 10mg nightly  -HC cream BID PRN  Current trmt:  -Zyrtec 40mg--started noticing improvement after 3 weeks of this dose (on week 3 of this dose)  -HC cream BID PRN  Effect of current treatment: Improved with less flares since last visit. Had gone several days in [place] without flares and then started last night when she came back home on legs   Other history:  Seen by Dr. Kamini Ayers (Allergy/Immuno) who thought hives were 2/2 COVID vaccine most likely. Offered allergy prick testing, but thought wouldn't be as helpful. Can't see notes in chart. Labs from 12/2021 including iron panel, TSH, CBC, CMP WNL    *Personal history of skin cancer: None  *Family history of skin cancer: None    Review of Systems:  Gen: Feels well, good sense of health. Skin: No new or changing moles, no history of keloids or hypertrophic scars. Past Medical History, Family History, Surgical History, Medications and Allergies reviewed. History reviewed. No pertinent past medical history.   Past Surgical History:   Procedure Laterality Date    KNEE SURGERY  01/01/2012    left    KNEE SURGERY Right     2015 and 2017    WISDOM TOOTH EXTRACTION         No Known Allergies  Outpatient Medications Marked as Taking for the 10/10/22 encounter (Office Visit) with Marlys Martínez MD   Medication Sig Dispense Refill    hydrOXYzine HCl (ATARAX) 10 MG tablet TAKE 1 TABLET NIGHTLY AS NEEDED FOR ITCH 30 tablet 0    cetirizine (ZYRTEC) 10 MG tablet TAKE UP TO 4 TABLETS DAILY FOR CHRONIC URTICARIA 120 tablet 1    drospirenone-ethinyl estradiol (ADDISON) 3-0.02 MG per tablet            Physical Examination   No acute distress. Mood clear/affect appropriate. Alert and oriented. Mucous membranes moist.  Sclera anicteric. Limited body skin exam was conducted to include the scalp, face (around mask) neck, chest, abdomen, back, right and left hands and forearms, right and left leg and feet and was normal with the following exceptions:   + Dermatographism  -No active wheals today      Assessment and Plan     1. Chronic urticaria, diffuse body involvement--improved, but not controlled   -Reviewed etiology, prognosis, trmt option. Pt has had recurrent hives for > 6 weeks with 2-3 flares per week initially before getting up to 40mg zyrtec daily which she has been on for 3 weeks   -Reviewed cause of acute/chronic urticaria is more often idiopathic. Had seen Dr. Joseph Morales last in 2021 who offered prick testing which I think would be beneficial at this point  -Recommend:  -Cont cetirizine (ZYRTEC) 40mg daily for at least 3 more weeks and beyond since flares have improved on this  -Cont OTC HC cream BID PRN for no more than 15 days per month to 1 area  -Agree with seeing Dr. Joseph Morales for prick testing   -I will fax Dr. Jamie Chavez office my notes to update with our plan thus far.  Unclear if he would add additional blood work, but I would order our chronic urticaria panel including CBC with differential, LFTs, ESR, and Thyroid studies that I would add to any labs he sees fit. Will fax my note to his office       Note is transcribed using voice recognition software. Inadvertent computerized transcription errors may be present.     Gonsalo Delcid MD

## 2022-10-10 NOTE — PATIENT INSTRUCTIONS
Thank you for visiting [address] [place] [address] Dermatology today!  Please follow the instructions below as we discussed in clinic:    1) Continue Zyrtec 40mg daily   2) Continue hydrocortisone cream as you need  3) Agree with seeing Dr. Joseph Morales again to discuss prick testing and further lab work up    Medications: Corticosteroid; Generic  Fougera   Betamethasone Valerate Lotion (0.1%)   Betamethasone Valerate Lotion (0.1%)   Triamcinolone Acetonide Ointment (0.025% and 0.1%)   Clobetasol Propionate Topical Solution (0.05%)   Betamethasone Dipropionate Ointment (0.05%)   Betamethasone Dipropionate Lotion (0.05%)      Perrigo   Mometasone Furoate Topical Solution (0.1%)   Desonide Ointment [0.05%]   Triamcinolone Acetonide Ointment (0.025%, 0.1% and 0.5%)   Betamethasone Dipropionate Lotion    Fluocinolone Acetonide Topical Scalp Oil, 0.01%      Taro   Desoximetasone Ointment (0.05%)   Hydrocortisone Butyrate Cream (0.1%)   Hydrocortisone Butyrate Ointment (0.1%)   Hydrocortisone Butyrate Solution (0.1%)   Desoximetasone Gel [0.05%]   Desoximetasone Ointment [0.25%]   Desonide Ointment (0.05%)   Oralone Triamcinolone Acetonide Dental Paste (0.1%)   Triamcinolone Acetonide Ointment (0.1%)   Triamcinolone Acetonide Dental Paste [0.1%]   Clobetasol Propionate Topical Solution       Teva   Fluocinonide Ointment [0.05]   Beta-Kya Lotion (0.1%)     Medications: Miscellaneous, Rx  Fougera  Tacrolimus 0.03% Ointment      Perrigo  Tacrolimus Ointment 0.03%      Protopic  0.03% or 0.1% Ointment     Antiperspirants\Deodorants  Alaway  Deoderant      Certain Dri  Prescription Strength Clinical Roll-On Antiperspirant      Cleure   Roll-On or Spray Deodorant, Aluminum Free   Stick Deodorant, Crystal      Crystal   Body Deodorant Stick   Roll-On Body Deodorant, Unscented      Kiss My Face Liquid [employer] on DRAMMEN, Fragrance Free      Madai  Madai For Men Advanced Invisible Roll-On Antiperspirant & Deodorant, Unscented      Sure Antiperspirant & Deodorant Aerosol, Unscented      Vanicream   Antiperspirant/Deodorant   Deodorant     Hair Care: Conditioners  Cleure  Replenishing Conditioner      DHS   DHS Conditioning Rinse With Panthenol   DHS Color Safe Rinse With Panthenol      No-Nothing  Fragrance Free Very Sensitive Moisture Conditioner      TrueCider  Creamy Conditioner      VMV Hypoallergenics  Essence Skin-Saving Milk Conditioner     Hair Care: Shampoos  Mamta's Tallow Treasures  Unscented Shampoo Soap      Cleure  Shampoo, Hydrating & Volumizing      CLn   Cln Healthy Scalp Shampoo   Moisture Rich Gentle Shampoo    Cln 2-in-1 Gentle Wash and Shampoo      [employer] Garden Soaps  Fragrance Free Solid Shampoo Bar      J.PRINCE Rothman's  Moisturizing Formula Shampoo Bar      [place]  Shampoo Bar, Avocado Oil, Fragrance-Free      Skinny & Co.  Clarifying Raw Shampoo Bar      The Soap Opera  Beekman Goat Milk Fragrance Free Shampoo Bar      TrueCider  True Cider Shampoo and Body Wash      VMV Hypoallergenics   Essence Skin-Saving Rk Wash Hair + Body \"Big Softie\" Shampoo   Essence Skin-Saving Superwash Hair + Body Milk Shampoo      Whiff   Unscented Shampoo Bar     Hair Care: Shampoos, Dry  Dove  Care Between Washes Unscented Dry Shampoo      No-Nothing  Sensitive Dry Shampoo, Fragrance Free      Not Your Mother's  Clean Freak Unscented Dry Shampoo     Hair Care: Stylers and Treatments  Biolage by Matrix  R.A.W.  Texturizing Styling Hair Spray (d)      Cleure   Hair Styling Gel, Medium Hold   Hair Spray, Firm Hold      Clinique  Hair Care Non-Aerosol Hairspray, Gentle Hold      Free & Clear  Styling and Finishing Hair Spray, Soft Hold (d)      No-Nothing   Fragrance Free Very Sensitive And Super Strong Hairspray   Fragrance Free Very Sensitive And Strong Hairspray      Vanicream   Hair Gel, For Sensitive Skin   Hair Spray, Firm Hold     Skin Care: Hand   Babyganics  Alcohol-Free Foaming Hand , Fragrance Free      Ray Slade My Face  Moisturizing Hand  with Alcohol      La Roche-Posay   Gel Hydro-Alcoolique Purifying Hand Gel   Alcohol Antiseptic Hand       VMV Hypoallergenics  Gregory Jeong's Kid Gloves Monolaurin Moisturizing \"Make-It-\" Hand Gel      Whole Foods 365  Refreshing Gel Hand , Fragrance Free     Skin Care: Moisturizers  AmLactin   Daily Moisturizing Body Lotion   Rapid Relief Restoring Lotion      Aveeno   Baby Eczema Therapy Moisturizing Cream   Eczema Therapy Daily Moisturizing Cream   Eczema Therapy Hand And Face Cream (d)   Daily Moisturizing Sheer Hydration Lotion      Medtronic Sleep Potion Night Cream      CeraVe   Moisturizing Cream   Daily Moisturizing Lotion   P.M.  Facial Moisturizing Lotion   SA Lotion For Rough Bumpy Skin   Therapeutic Hand Cream   SA Cream For Rough Bumpy Skin   Healing Ointment   Baby Moisturizing Lotion   Baby Moisturizing Cream   Psoriasis Moisturizing Cream   Baby Healing Ointment      Cetaphil   Daily Hydrating Lotion with Hyaluronic Acid   Rich Hydrating Cream   Intensive Moisturizing Cream   Cracked Skin Repair Lotion      Cleure  Oil Free Facial Lotion for Sensitive Skin      Dove   DermaSeries Eczema Relief Body Lotion   Baby Dove Sensitive Moisture Lotion   DermaSeries Dry Skin Relief Body Cream      Elta MD   Moisturizer, Intense Moisturizer   Face, Hands & Body Lotion   AM Therapy Facial Moisturizer   So Silky Hand Crème      Lubriderm   Advanced Therapy Fragrance-Free Lotion   Advanced Therapy Fragrance-Free Lotion   Daily Moisture Lotion, Fragrance Free      Neutrogena  [place] Formula Hand Cream, Fragrance Free      Kumar's  Coconut Oil Formula Body Oil      TrueCider  Face & Body Serum      Vanicream   Daily Facial Moisturizer For Sensitive Skin   Moisturizing Ointment      Vaseline   Intensive Care Advanced Repair Unscented Lotion   Vaseline Original Healing Jelly      VMV Hypoallergenics   Gregory Bartholomewe's Hero Das Gregory Jeong's Mommycoddling All-Over Lotion   Red Better Calm-The-Heck-Down [place] Steroid-Free Salve   Essence Hand + Body Smoother Lotion     Facial Moisturizers with SPF  Aveeno  Daily Moisturizing Lotion With Sunscreen SPF 15      CeraVe   A.M.  Facial Moisturizing Lotion SPF 30   Skin Renewing Day Cream SPF 30      Cetaphil  DermaControl Oil Absorbing Moisturizer SPF 30      Dove  DermaSeries Dry Skin Relief Face Cream SPF 15      Eucerin  Redness Relief Day Lotion Broad Spectrum SPF 15      La Roche-Posay  Toleriane Double Repair Face Moisturizer SPF 30      Neutrogena  Hydro Boost Hyaluronic Acid Moisturizer SPF 50      Olay   Regenerist Hydrating Moisturizer with Mineral SPF 15 (Fragrance Free)   Regenerist Hydrating Moisturizer with Mineral SPF 30 (Fragrance Free)     Skin Care: Soaps\Cleansers  AB  Skincare Cleanser      CeraVe   Hydrating Facial Cleanser   Eczema Body Wash (d)   Soothing Body Wash      Cetaphil  Gentle Skin Cleanser      Cleure   Glycerine Face/Body SLS Free Bar, Unscented   Pure Clarifying Face & Body The [employer] American   All About Clean Foaming Facial Soap   All About Clean Rinse-Off Foaming Cleanser      CLn  Moisture Balancing Facial Cleanser      Dove  DermaSeries Dry Skin Relief Face Wash      Free & Clear  Liquid Cleanser      Kiss My Face  Olive Oil Bar Soap, Fragrance Free      Neutrogena  Transparent Facial Bar Fragrance-Free      Timoteo's of [place]  Fragrance Free Sensitive Beauty Bar with Aloe Vera (      TrueCider  Gentle Creamy Cleanser      VMV Hypoallergenics  Moisture Rich Creammmy Cleansing Milk For Dry Skin       Skin Care: Sunscreens  Banana Boat   Kids Tear-Free Sunscreen Lotion SPF 50 (d)   Simply Protect Baby Tear-Free Mineral-Based Sunscreen Lotion SPF 50+ (d)   Sport Mineral Enriched Sunscreen Spray SPF 50+      Cleure  Sunscreen SPF 30 for Sensitive Skin      [address] [facility] [address] Broad Spectrum SPF 50 Daily Energy + Face Protector      Coppertone  Kids Tear Free Sunscreen Lotion SPF 48      Elta MD   UV AERO Full-Body Sunscreen spray SPF 39   UV Elements Tinted Facial Sunscreen Broad Spectrum SPF 44   UV Replenish Facial Sunscreen Broad Spectrum SPF 40      Solbar  Thirty SPF 30     Household Products: Dishwashing  7th Generation   Powerful Clean  Detergent Pacs, Free & Clear   Powerful Clean  Detergent Powder, Free & Clear      Biokleen  Free & Clear Automatic [facility] Pods      Whole Foods 365   Detergent, Packs, unscented     Household Products: Laundry Detergents  7th Generation  Laundry Detergent Packs, Free & Clear      All  Free Clear Detergent Powder      Dreft  Family Freindly Liquid Detergent, Unscented      Tide  Ultra Tide Free & Gentle Powdered Detergent      Whole Foods   Organic Laundry Detergent, Unscented   Organic Baby Laundry Detergent Liquid, Unscented      Whole Foods 365   Powdered Laundry Detergent, Unscented   Baby Laundry Detergent Liquid, unscented     Household Products: Laundry Fabric Softener\Bleach\Additives  7th Generation   Chlorine Free HASKAYNE, Free & Clear   Fabric Softener Sheets, Free & Clear      Biokleen  Fragrance Free Dryer Sheets      Clorox  Gentle Free & Clear Bleach      Whole Foods 365  Fabric Softening Dryer Sheets, unscented     Shaving  Melvi   Mangham 3 Hydrating Shave Gel Razor Cartridges   Hydro 5 Hydrating Shave Gel Razor Cartridges      Vanicream  Shave Cream for Sensitive Skin      VMV Hypoallergenics   1635 Sm[place]ing Aftershave Solution   1635 Pre-Shave [place] Oil For All Skin Types     Wipes  Pampers   Sensitive Baby Wipes   Aqua Pure Wipes

## 2022-10-21 DIAGNOSIS — L50.8 CHRONIC URTICARIA: ICD-10-CM

## 2022-10-21 RX ORDER — HYDROXYZINE HYDROCHLORIDE 10 MG/1
TABLET, FILM COATED ORAL
Qty: 30 TABLET | Refills: 0 | Status: SHIPPED | OUTPATIENT
Start: 2022-10-21

## 2022-10-21 RX ORDER — CETIRIZINE HYDROCHLORIDE 10 MG/1
TABLET ORAL
Qty: 120 TABLET | Refills: 1 | Status: SHIPPED | OUTPATIENT
Start: 2022-10-21

## 2022-10-27 ENCOUNTER — TELEPHONE (OUTPATIENT)
Dept: DERMATOLOGY | Age: 27
End: 2022-10-27

## 2022-10-27 NOTE — TELEPHONE ENCOUNTER
Pt calling wanting to schedule a f/u appt with  she states she saw a Allergist doctor pls return call back @ [phone]

## 2022-10-27 NOTE — TELEPHONE ENCOUNTER
Pt calling wanting to schedule appt with  for a f/u she states she saw a Allergist doctor wanting to f/u with  pls return adam back @ [phone]

## 2022-11-19 DIAGNOSIS — L50.8 CHRONIC URTICARIA: ICD-10-CM

## 2022-11-21 RX ORDER — CETIRIZINE HYDROCHLORIDE 10 MG/1
TABLET ORAL
Qty: 120 TABLET | Refills: 1 | Status: SHIPPED | OUTPATIENT
Start: 2022-11-21

## 2022-11-21 RX ORDER — HYDROXYZINE HYDROCHLORIDE 10 MG/1
TABLET, FILM COATED ORAL
Qty: 30 TABLET | Refills: 0 | Status: SHIPPED | OUTPATIENT
Start: 2022-11-21

## 2022-12-05 LAB — PAP SMEAR, EXTERNAL: NEGATIVE

## 2022-12-15 ENCOUNTER — OFFICE VISIT (OUTPATIENT)
Dept: DERMATOLOGY | Age: 27
End: 2022-12-15
Payer: COMMERCIAL

## 2022-12-15 DIAGNOSIS — L50.8 CHRONIC URTICARIA: Primary | ICD-10-CM

## 2022-12-15 DIAGNOSIS — L29.8 OTHER PRURITUS: ICD-10-CM

## 2022-12-15 PROCEDURE — 99214 OFFICE O/P EST MOD 30 MIN: CPT | Performed by: INTERNAL MEDICINE

## 2022-12-15 RX ORDER — TRIAMCINOLONE ACETONIDE 1 MG/G
CREAM TOPICAL
Qty: 80 G | Refills: 0 | Status: SHIPPED | OUTPATIENT
Start: 2022-12-15

## 2022-12-15 NOTE — PROGRESS NOTES
[facility]  Marion Mckenna MD  [phone]    Date of Visit: 12/15/2022  LV 10/2022    Dina Zheng is a 33 y.o. female who presents for chronic urticaria. Chief Complaint:   Chief Complaint   Patient presents with    Follow-up     Follow up rash- arms and legs- doesn't have a rash presently          History of Present Illness:    Concern: Chronic urticaria  Duration: Since after getting COVID booster vaccine (11 days after) in Nov 2021. Got better in Spring 2022, but has slowly started to recur over last several months  Location: Started on arms, back-->spread to abdomen/legs at last visit. Now only flaring on legs mostly  Symptoms: Itchy; rash comes and goes on different body parts but most prominently on lower body now   Exacerbating factors: Heat makes worse, hot showers, sweating   Previous treatments:    -Steroids + hydroxyzine at the beginning--hydroxyzine made too sedating  -Famotidine in AM--didn't really take it  -Cetirizine 10mg daily   -HC cream BID PRN  Current trmt:  -Zyrtec 40mg--started noticing improvement on this dose, less flares and less severe  -HC cream BID PRN  Effect of current treatment: Improved with less flares since starting to see me. Mostly notices flares after laser hair removal. Occasionally getting flares on neck, arms, random areas that last less time without clear trigger. Flaring like this 1-2 times per week   Other history:  Seen by Dr. Jaida Maldonado (Allergy/Immuno) who thought hives were 2/2 COVID vaccine most likely. Offered allergy prick testing, but thought wouldn't be as helpful. Saw again a few weeks ago, discussed options of omalizumab, plaquenil etc, but didn't recommend these given control on current regimen. Didn't recommend prick testing  Labs from 12/2021 including iron panel, TSH, CBC, CMP WNL    *Personal history of skin cancer: None  *Family history of skin cancer: None    Review of Systems:  Gen: Feels well, good sense of health.   Skin: No new or changing moles, no history of keloids or hypertrophic scars. Past Medical History, Family History, Surgical History, Medications and Allergies reviewed. History reviewed. No pertinent past medical history. Past Surgical History:   Procedure Laterality Date    KNEE SURGERY  01/01/2012    left    KNEE SURGERY Right     2015 and 2017    WISDOM TOOTH EXTRACTION         No Known Allergies  Outpatient Medications Marked as Taking for the 12/15/22 encounter (Office Visit) with Heavenly Clemons MD   Medication Sig Dispense Refill    cetirizine (ZYRTEC) 10 MG tablet TAKE UP TO 4 TABLETS DAILY FOR CHRONIC URTICARIA 120 tablet 1    hydrOXYzine HCl (ATARAX) 10 MG tablet TAKE 1 TABLET NIGHTLY AS NEEDED FOR ITCH 30 tablet 0    drospirenone-ethinyl estradiol (ADDISON) 3-0.02 MG per tablet            Physical Examination   No acute distress. Mood clear/affect appropriate. Alert and oriented. Mucous membranes moist.  Sclera anicteric. Limited body skin exam was conducted to include the scalp, face (around mask) neck, chest, abdomen, back, right and left hands and forearms, right and left leg and feet and was normal with the following exceptions:   -No active wheals today      Assessment and Plan     1. Chronic urticaria, diffuse body involvement--improved, but not controlled   -Reviewed etiology, prognosis, trmt option.  Pt has had recurrent hives for > 6 weeks with 2-3 flares per week initially before getting up to 40mg zyrtec daily which she has been on for 3 weeks   -Reviewed cause of acute/chronic urticaria is more often idiopathic  -Recommend:  -Cont cetirizine (ZYRTEC) 40mg daily   -Start famotidine daily given still having flares  -Stop OTC HC cream, try triamcinolone cream BID PRN for flares/itch for no more than 2 weeks per month (reviewed SE of long term steroid use)   -Will discuss with Dr. Taurus Arnett about prick testing or other options and his thoughts on this    RTC 2 mo    Note is transcribed using voice recognition software. Inadvertent computerized transcription errors may be present.     Safia Benites MD

## 2022-12-15 NOTE — PATIENT INSTRUCTIONS
Thank you for visiting [facility] today!  Please follow the instructions below as we discussed in clinic:    1) Continue Zyrtec 40mg daily   2) Continue hydrocortisone cream as you need  3) Add famotidine or Allegra daily    Medications: Corticosteroid; Generic  Fougera   Betamethasone Valerate Lotion (0.1%)   Betamethasone Valerate Lotion (0.1%)   Triamcinolone Acetonide Ointment (0.025% and 0.1%)   Clobetasol Propionate Topical Solution (0.05%)   Betamethasone Dipropionate Ointment (0.05%)   Betamethasone Dipropionate Lotion (0.05%)      Perrigo   Mometasone Furoate Topical Solution (0.1%)   Desonide Ointment [0.05%]   Triamcinolone Acetonide Ointment (0.025%, 0.1% and 0.5%)   Betamethasone Dipropionate Lotion    Fluocinolone Acetonide Topical Scalp Oil, 0.01%      Taro   Desoximetasone Ointment (0.05%)   Hydrocortisone Butyrate Cream (0.1%)   Hydrocortisone Butyrate Ointment (0.1%)   Hydrocortisone Butyrate Solution (0.1%)   Desoximetasone Gel [0.05%]   Desoximetasone Ointment [0.25%]   Desonide Ointment (0.05%)   Oralone Triamcinolone Acetonide Dental Paste (0.1%)   Triamcinolone Acetonide Ointment (0.1%)   Triamcinolone Acetonide Dental Paste [0.1%]   Clobetasol Propionate Topical Solution       Teva   Fluocinonide Ointment [0.05]   Beta-Kya Lotion (0.1%)     Medications: Miscellaneous, Rx  Fougera  Tacrolimus 0.03% Ointment      Perrigo  Tacrolimus Ointment 0.03%      Protopic  0.03% or 0.1% Ointment     Antiperspirants\Deodorants  Alaway  Deoderant      Certain Dri  Prescription Strength Clinical Roll-On Antiperspirant      Cleure   Roll-On or Spray Deodorant, Aluminum Free   Stick Deodorant, Crystal      Crystal   Body Deodorant Stick   Roll-On Body Deodorant, Unscented      Kiss My Face Liquid Hexion Specialty Chemicals on DRAMMEN, Fragrance Free      Madai  Madai For Men Advanced Invisible Roll-On Antiperspirant & Deodorant, Unscented      Sure  Antiperspirant & Deodorant Aerosol, Unscented      Vanicream Antiperspirant/Deodorant   Deodorant     Hair Care: Conditioners  Cleure  Replenishing Conditioner      DHS   DHS Conditioning Rinse With Panthenol   DHS Color Safe Rinse With Panthenol      No-Nothing  Fragrance Free Very Sensitive Moisture Conditioner      TrueCider  Creamy Conditioner      VMV Hypoallergenics  Essence Skin-Saving Milk Conditioner     Hair Care: Shampoos  Mamta's Tallow Treasures  Unscented Shampoo Soap      Cleure  Shampoo, Hydrating & Volumizing      CLn   Cln Healthy Scalp Shampoo   Moisture Rich Gentle Shampoo    Cln 2-in-1 Gentle Wash and Shampoo      Conneaut Garden Soaps  Fragrance Free Solid Shampoo Bar      J.R. Stephan's  Moisturizing Formula Shampoo Bar      [place]  Shampoo Bar, Avocado Oil, Fragrance-Free      Skinny & Co.  Clarifying Raw Shampoo Bar      The Soap Opera  Beekman Goat Milk Fragrance Free Shampoo Bar      TrueCider  True Cider Shampoo and Body Wash      VMV Hypoallergenics   Essence Skin-Saving Rk Wash Hair + Body \"Big Softie\" Shampoo   Essence Skin-Saving Superwash Hair + Body Milk Shampoo      Whiff   Unscented Shampoo Bar     Hair Care: Shampoos, Dry  Dove  Care Between Washes Unscented Dry Shampoo      No-Nothing  Sensitive Dry Shampoo, Fragrance Free      Not Your Mother's  Clean Freak Unscented Dry Shampoo     Hair Care: Stylers and Treatments  Biolage by Matrix  R.A.W.  Texturizing Styling Hair Spray (d)      Cleure   Hair Styling Gel, Medium Hold   Hair Spray, Firm Hold      Clinique  Hair Care Non-Aerosol Hairspray, Gentle Hold      Free & Clear  Styling and Finishing Hair Spray, Soft Hold (d)      No-Nothing   Fragrance Free Very Sensitive And Super Strong Hairspray   Fragrance Free Very Sensitive And Strong Hairspray      Vanicream   Hair Gel, For Sensitive Skin   Hair Spray, Firm Hold     Skin Care: Hand   Babyganics  Alcohol-Free Foaming Hand , Fragrance Free      Kiss My Face  Moisturizing Hand  with Alcohol      La Roche-Posay   Gel Hydro-Alcoolique Purifying Hand Gel   Alcohol Antiseptic Hand       VMV Hypoallergenics  Grandma Adenike's Kid Gloves Monolaurin Moisturizing \"Make-It-\" Hand Gel      Whole Foods 365  Refreshing Gel Hand , Fragrance Free     Skin Care: Moisturizers  AmLactin   Daily Moisturizing Body Lotion   Rapid Relief Restoring Lotion      Aveeno   Baby Eczema Therapy Moisturizing Cream   Eczema Therapy Daily Moisturizing Cream   Eczema Therapy Hand And Face Cream (d)   Daily Moisturizing Sheer Hydration Lotion      Medtronic Sleep Potion Night Cream      CeraVe   Moisturizing Cream   Daily Moisturizing Lotion   P.M.  Facial Moisturizing Lotion   SA Lotion For Rough Bumpy Skin   Therapeutic Hand Cream   SA Cream For Rough Bumpy Skin   Healing Ointment   Baby Moisturizing Lotion   Baby Moisturizing Cream   Psoriasis Moisturizing Cream   Baby Healing Ointment      Cetaphil   Daily Hydrating Lotion with Hyaluronic Acid   Rich Hydrating Cream   Intensive Moisturizing Cream   Cracked Skin Repair Lotion      Cleure  Oil Free Facial Lotion for Sensitive Skin      Dove   DermaSeries Eczema Relief Body Lotion   Baby Dove Sensitive Moisture Lotion   DermaSeries Dry Skin Relief Body Cream      Elta MD   Moisturizer, Intense Moisturizer   Face, Hands & Body Lotion   AM Therapy Facial Moisturizer   So Silky Hand Crème      Lubriderm   Advanced Therapy Fragrance-Free Lotion   Advanced Therapy Fragrance-Free Lotion   Daily Moisture Lotion, Fragrance Free      Neutrogena  [place] Formula Hand Cream, Fragrance Free      Kumar's  Coconut Oil Formula Body Oil      TrueCider  Face & Body Serum      Vanicream   Daily Facial Moisturizer For Sensitive Skin   Moisturizing Ointment      Vaseline   Intensive Care Advanced Repair Unscented Lotion   Vaseline Original Healing Jelly      VMV Hypoallergenics   Grandma Adenike's The Hannah maxim, [place] Read-Reda Stevinson   Grandma Adenike's Mommycoddling All-Over Lotion   Red Better Calm-The-Heck-Down Winifrede Steroid-Free Salve   Essence Hand + Body Smoother Lotion     Facial Moisturizers with SPF  Aveeno  Daily Moisturizing Lotion With Sunscreen SPF 15      CeraVe   A.M.  Facial Moisturizing Lotion SPF 30   Skin Renewing Day Cream SPF 30      Cetaphil  DermaControl Oil Absorbing Moisturizer SPF 30      Dove  DermaSeries Dry Skin Relief Face Cream SPF 15      Eucerin  Redness Relief Day Lotion Broad Spectrum SPF 15      La Roche-Posay  Toleriane Double Repair Face Moisturizer SPF 30      Neutrogena  Hydro Boost Hyaluronic Acid Moisturizer SPF 50      Olay   Regenerist Hydrating Moisturizer with Mineral SPF 15 (Fragrance Free)   Regenerist Hydrating Moisturizer with Mineral SPF 30 (Fragrance Free)     Skin Care: Soaps\Cleansers  AB  Skincare Cleanser      CeraVe   Hydrating Facial Cleanser   Eczema Body Wash (d)   Soothing Body Wash      Cetaphil  Gentle Skin Cleanser      Cleure   Glycerine Face/Body SLS Free Bar, Unscented   Pure Clarifying Face & Body The First American   All About Clean Foaming Facial Soap   All About Clean Rinse-Off Foaming Cleanser      CLn  Moisture Balancing Facial Cleanser      Dove  DermaSeries Dry Skin Relief Face Wash      Free & Clear  Liquid Cleanser      Kiss My Face  Olive Oil Bar Soap, Fragrance Free      Neutrogena  Transparent Facial Bar Fragrance-Free      Timoteo's of [facility]  Fragrance Free Sensitive Beauty Bar with Aloe Vera (      TrueCider  Gentle Creamy Cleanser      VMV Hypoallergenics  Moisture Rich Creammmy Cleansing Milk For Dry Skin       Skin Care: Sunscreens  Banana Boat   Kids Tear-Free Sunscreen Lotion SPF 50 (d)   Simply Protect Baby Tear-Free Mineral-Based Sunscreen Lotion SPF 50+ (d)   Sport Mineral Enriched Sunscreen Spray SPF 50+      Cleure  Sunscreen SPF 30 for Sensitive Skin      125 Karen Gauthier Broad Spectrum SPF 50 Daily Energy + Face Protector      Coppertone  Kids Tear Free Sunscreen Lotion SPF 48      Elta MD   UV AERO Full-Body Sunscreen spray SPF 45   UV Elements Tinted Facial Sunscreen Broad Spectrum SPF 44   UV Replenish Facial Sunscreen Broad Spectrum SPF 40      Solbar  Thirty SPF 30     Household Products: Dishwashing  7th Generation   Powerful Clean  Detergent Pacs, Free & Clear   Powerful Clean  Detergent Powder, Free & Clear      Biokleen  Free & Clear Automatic [facility] Pods      Whole Foods 365   Detergent, Packs, unscented     Household Products: Laundry Detergents  7th Generation  Laundry Detergent Packs, Free & Clear      All  Free Clear Detergent Powder      Dreft  Family Freindly Liquid Detergent, Unscented      Tide  Ultra Tide Free & Gentle Powdered Detergent      Whole Foods   Organic Laundry Detergent, Unscented   Organic Baby Laundry Detergent Liquid, Unscented      Whole Foods 365   Powdered Laundry Detergent, Unscented   Baby Laundry Detergent Liquid, unscented     Household Products: Laundry Fabric Softener\Bleach\Additives  7th Generation   Chlorine Free HASKAYNE, Free & Clear   Fabric Softener Sheets, Free & Clear      Biokleen  Fragrance Free Dryer Sheets      Clorox  Gentle Free & Clear Bleach      Whole Foods 365  Fabric Softening Dryer Sheets, unscented     Shaving  Melvi   Julian 3 Hydrating Shave Gel Razor Cartridges   Hydro 5 Hydrating Shave Gel Razor Cartridges      Vanicream  Shave Cream for Sensitive Skin      VMV Hypoallergenics   1635 Smoothening Aftershave Solution   1635 Pre-Shave [place] Oil For All Skin Types     Wipes  Pampers   Sensitive Baby Wipes   Aqua Pure Wipes

## 2022-12-26 DIAGNOSIS — L50.8 CHRONIC URTICARIA: ICD-10-CM

## 2022-12-27 RX ORDER — HYDROXYZINE HYDROCHLORIDE 10 MG/1
TABLET, FILM COATED ORAL
Qty: 30 TABLET | Refills: 0 | Status: SHIPPED | OUTPATIENT
Start: 2022-12-27

## 2022-12-27 RX ORDER — CETIRIZINE HYDROCHLORIDE 10 MG/1
TABLET ORAL
Qty: 120 TABLET | Refills: 1 | Status: SHIPPED | OUTPATIENT
Start: 2022-12-27

## 2023-02-07 DIAGNOSIS — L50.8 CHRONIC URTICARIA: ICD-10-CM

## 2023-02-07 RX ORDER — HYDROXYZINE HYDROCHLORIDE 10 MG/1
TABLET, FILM COATED ORAL
Qty: 30 TABLET | Refills: 0 | Status: SHIPPED | OUTPATIENT
Start: 2023-02-07

## 2023-02-20 ENCOUNTER — TELEPHONE (OUTPATIENT)
Dept: DERMATOLOGY | Age: 28
End: 2023-02-20

## 2023-02-20 NOTE — TELEPHONE ENCOUNTER
----- Message from Lexi Hill MD sent at 2/20/2023 12:34 PM EST -----  Regarding: FW: Hives are going away! Hi,     Can we move her follow up appt to late March/April instead of Feb?    Thanks,  Rmhardik Mary Carmenchristel  ----- Message -----  From: Renzo Esha  Sent: 2/20/2023   8:23 AM EST  To: Lexi Hill MD  Subject: FW: Hives are going away!                          ----- Message -----  From: Theodore Allen  Sent: 2/19/2023   3:04 PM EST  To: [facility]  Subject: Hives are going away! Hi Dr. Fink Post,    I just wanted to send you an updated regarding my urticaria. I havent had a breakthrough of hives since Dec 2022 (last time I took hydroxyzine too). The last few weeks I reduced taking Zyrtec to 2x/day and havent noticed any changes. I will occasionally have itchy skin on my lower legs and abdomen when my skin is dry and after wearing tight clothes, but Ive only had to use a small amount of the regular hydrocortisone cream and have not had any hives. I noticed we have an appointment coming up soon and wasnt sure if we should reschedule or cancel since the urticaria has become much better controlled since our last appointment. Hope youre doing well!     Theodore Allen

## 2023-02-27 SDOH — ECONOMIC STABILITY: FOOD INSECURITY: WITHIN THE PAST 12 MONTHS, YOU WORRIED THAT YOUR FOOD WOULD RUN OUT BEFORE YOU GOT MONEY TO BUY MORE.: NEVER TRUE

## 2023-02-27 SDOH — ECONOMIC STABILITY: INCOME INSECURITY: HOW HARD IS IT FOR YOU TO PAY FOR THE VERY BASICS LIKE FOOD, HOUSING, MEDICAL CARE, AND HEATING?: NOT HARD AT ALL

## 2023-02-27 SDOH — ECONOMIC STABILITY: FOOD INSECURITY: WITHIN THE PAST 12 MONTHS, THE FOOD YOU BOUGHT JUST DIDN'T LAST AND YOU DIDN'T HAVE MONEY TO GET MORE.: NEVER TRUE

## 2023-02-27 SDOH — ECONOMIC STABILITY: HOUSING INSECURITY
IN THE LAST 12 MONTHS, WAS THERE A TIME WHEN YOU DID NOT HAVE A STEADY PLACE TO SLEEP OR SLEPT IN A SHELTER (INCLUDING NOW)?: NO

## 2023-02-27 SDOH — ECONOMIC STABILITY: TRANSPORTATION INSECURITY
IN THE PAST 12 MONTHS, HAS LACK OF TRANSPORTATION KEPT YOU FROM MEETINGS, WORK, OR FROM GETTING THINGS NEEDED FOR DAILY LIVING?: NO

## 2023-03-02 ENCOUNTER — OFFICE VISIT (OUTPATIENT)
Dept: PRIMARY CARE CLINIC | Age: 28
End: 2023-03-02
Payer: COMMERCIAL

## 2023-03-02 VITALS
OXYGEN SATURATION: 100 % | SYSTOLIC BLOOD PRESSURE: 132 MMHG | BODY MASS INDEX: 21.49 KG/M2 | HEART RATE: 83 BPM | WEIGHT: 129 LBS | HEIGHT: 65 IN | DIASTOLIC BLOOD PRESSURE: 81 MMHG

## 2023-03-02 DIAGNOSIS — R53.83 LOW ENERGY: Primary | ICD-10-CM

## 2023-03-02 DIAGNOSIS — R53.83 LOW ENERGY: ICD-10-CM

## 2023-03-02 DIAGNOSIS — R41.3 MEMORY CHANGES: ICD-10-CM

## 2023-03-02 DIAGNOSIS — R13.12 OROPHARYNGEAL DYSPHAGIA: ICD-10-CM

## 2023-03-02 PROBLEM — Z98.890 HISTORY OF ANTERIOR CRUCIATE LIGAMENT SURGERY: Status: ACTIVE | Noted: 2023-03-02

## 2023-03-02 LAB
A/G RATIO: 1.4 (ref 1.1–2.2)
ALBUMIN SERPL-MCNC: 4.1 G/DL (ref 3.4–5)
ALP BLD-CCNC: 45 U/L (ref 40–129)
ALT SERPL-CCNC: 16 U/L (ref 10–40)
ANION GAP SERPL CALCULATED.3IONS-SCNC: 13 MMOL/L (ref 3–16)
AST SERPL-CCNC: 25 U/L (ref 15–37)
BILIRUB SERPL-MCNC: 0.3 MG/DL (ref 0–1)
BUN BLDV-MCNC: 11 MG/DL (ref 7–20)
CALCIUM SERPL-MCNC: 9.5 MG/DL (ref 8.3–10.6)
CHLORIDE BLD-SCNC: 100 MMOL/L (ref 99–110)
CO2: 25 MMOL/L (ref 21–32)
CREAT SERPL-MCNC: 0.9 MG/DL (ref 0.6–1.1)
FERRITIN: 7.1 NG/ML (ref 15–150)
GFR SERPL CREATININE-BSD FRML MDRD: >60 ML/MIN/{1.73_M2}
GLUCOSE BLD-MCNC: 96 MG/DL (ref 70–99)
IRON SATURATION: 11 % (ref 15–50)
IRON: 51 UG/DL (ref 37–145)
POTASSIUM SERPL-SCNC: 4.4 MMOL/L (ref 3.5–5.1)
SODIUM BLD-SCNC: 138 MMOL/L (ref 136–145)
TOTAL IRON BINDING CAPACITY: 461 UG/DL (ref 260–445)
TOTAL PROTEIN: 7 G/DL (ref 6.4–8.2)
TRANSFERRIN: 415 MG/DL (ref 200–360)
TSH REFLEX: 1.55 UIU/ML (ref 0.27–4.2)

## 2023-03-02 PROCEDURE — 99214 OFFICE O/P EST MOD 30 MIN: CPT | Performed by: FAMILY MEDICINE

## 2023-03-02 RX ORDER — DROSPIRENONE AND ETHINYL ESTRADIOL 0.02-3(28)
KIT ORAL
COMMUNITY

## 2023-03-02 ASSESSMENT — ENCOUNTER SYMPTOMS
ABDOMINAL PAIN: 0
DIARRHEA: 0
COUGH: 0
TROUBLE SWALLOWING: 1
SHORTNESS OF BREATH: 0
EYE PAIN: 0
CONSTIPATION: 0

## 2023-03-02 ASSESSMENT — PATIENT HEALTH QUESTIONNAIRE - PHQ9
SUM OF ALL RESPONSES TO PHQ QUESTIONS 1-9: 0
SUM OF ALL RESPONSES TO PHQ9 QUESTIONS 1 & 2: 0
SUM OF ALL RESPONSES TO PHQ QUESTIONS 1-9: 0
2. FEELING DOWN, DEPRESSED OR HOPELESS: 0
1. LITTLE INTEREST OR PLEASURE IN DOING THINGS: 0

## 2023-03-02 NOTE — PROGRESS NOTES
Chief Complaint   Patient presents with    Memory Loss    Fatigue         ASSESSMENT/PLAN:  1. Low energy  Improving over time with lifestyle modifications- sleep and clean diet  Update labs to rule out iron def, anemia, electrolyte abnormality, thyroid function abnormality and vit def. Follow up in 6 weeks to gauge improvement   - CBC with Auto Differential; Future  - Comprehensive Metabolic Panel; Future  - TSH with Reflex; Future  - Vitamin D 25 Hydroxy; Future  - Vitamin B12 & Folate; Future  - Ferritin; Future  - Iron and TIBC; Future  - Transferrin; Future    2. Oropharyngeal dysphagia  Differential includes anxiety vs stricture vs mechanical issues  Referral placed for potential scope  Notes no new stressors. Denies gerd and nausea  - Erika Burns MD, Gastroenterology, [place]    3. Memory changes  Unsure if this is just anxiety related to being put on the spot or if this is unsolicited. Will monitor at home and take note of potential moments this happens. Seems to be improving with more restful sleep and better diet. Follow up in 6 weeks to gauge improvement            HPI:  Cecilia Anderson is a 33 y.o. (: 1995) here today for memory recall, swallowing trouble and low energy. Memory/recall: for the last few months has noted. Couldn't remember cousin's name mid [facility] and is not remembering conversations with people. Swallowing issues since 2022. Trying to engage swallow muscles but cant unless she drinks water. No nausea or acid reflux. Low energy: improved now that she is making it a point to sleep 8 hours and eat clean. She is taking a multi vit daily now as well. Review of Systems   Constitutional:  Positive for fatigue. Negative for appetite change, chills and fever. HENT:  Positive for trouble swallowing. Negative for congestion. Eyes:  Negative for pain and visual disturbance. Respiratory:  Negative for cough and shortness of breath. Cardiovascular:  Negative for chest pain and palpitations. Gastrointestinal:  Negative for abdominal pain, constipation and diarrhea. Genitourinary:  Negative for difficulty urinating. Musculoskeletal:  Negative for arthralgias. Skin:  Negative for rash and wound. Neurological:  Negative for dizziness, weakness, light-headedness and headaches. Hematological:  Does not bruise/bleed easily. Psychiatric/Behavioral:  Negative for behavioral problems. History reviewed. No pertinent past medical history. Family History   Problem Relation Age of Onset    High Blood Pressure Mother     No Known Problems Father        Social History     Tobacco Use    Smoking status: Never    Smokeless tobacco: Never   Vaping Use    Vaping Use: Never used   Substance Use Topics    Alcohol use: Yes     Alcohol/week: 2.0 standard drinks     Types: 2 Glasses of wine per week    Drug use: No       New Prescriptions    No medications on file       Meds Prior to visit:  Current Outpatient Medications on File Prior to Visit   Medication Sig Dispense Refill    drospirenone-ethinyl estradiol (ADDISON) 3-0.02 MG per tablet       cetirizine (ZYRTEC) 10 MG tablet TAKE UP TO 4 TABLETS DAILY FOR CHRONIC URTICARIA 120 tablet 1     No current facility-administered medications on file prior to visit.      No Known Allergies    OBJECTIVE:  /81   Pulse 83   Ht 5' 5\" (1.651 m)   Wt 129 lb (58.5 kg)   SpO2 100%   BMI 21.47 kg/m²   BP Readings from Last 2 Encounters:   03/02/23 132/81   09/21/22 126/74     Wt Readings from Last 3 Encounters:   03/02/23 129 lb (58.5 kg)   09/21/22 127 lb 3.2 oz (57.7 kg)       Physical Exam    Lab Results   Component Value Date    WBC 5.3 09/22/2022    HGB 13.0 09/22/2022    HCT 38.4 09/22/2022    MCV 96.4 09/22/2022     09/22/2022     Lab Results   Component Value Date     09/22/2022    K 3.9 09/22/2022    CL 98 (L) 09/22/2022    CO2 24 09/22/2022    BUN 7 09/22/2022    CREATININE 0.7 09/22/2022    GLUCOSE 85 09/22/2022    CALCIUM 9.4 09/22/2022    PROT 7.2 09/22/2022    LABALBU 4.5 09/22/2022    BILITOT 0.6 09/22/2022    ALKPHOS 48 09/22/2022    AST 21 09/22/2022    ALT 13 09/22/2022    LABGLOM >60 09/22/2022    GFRAA >60 09/22/2022    AGRATIO 1.7 09/22/2022    GLOB 2.6 12/03/2020     Lab Results   Component Value Date    CHOL 198 09/22/2022     Lab Results   Component Value Date    TRIG 91 09/22/2022     Lab Results   Component Value Date    HDL 72 (H) 09/22/2022     Lab Results   Component Value Date    LDLCALC 108 (H) 09/22/2022     Lab Results   Component Value Date    LABVLDL 18 09/22/2022     No results found for: LABA1C      Discussed use, benefit, and side effects of prescribed medications. Barriers to medication compliance addressed. All patient questions answered. Pt voiced understanding. RTC Return in about 6 weeks (around 4/13/2023).     Future Appointments   Date Time Provider Val Strickland   5/2/2023  3:30 PM MD Aramis Swanson MD  3/2/2023  2:25 PM

## 2023-03-03 DIAGNOSIS — E61.1 IRON DEFICIENCY: Primary | ICD-10-CM

## 2023-03-03 LAB
BASOPHILS ABSOLUTE: 0 K/UL (ref 0–0.2)
BASOPHILS RELATIVE PERCENT: 1 %
EOSINOPHILS ABSOLUTE: 0.2 K/UL (ref 0–0.6)
EOSINOPHILS RELATIVE PERCENT: 4 %
FOLATE: >20 NG/ML (ref 4.78–24.2)
HCT VFR BLD CALC: 34.9 % (ref 36–48)
HEMOGLOBIN: 11.7 G/DL (ref 12–16)
LYMPHOCYTES ABSOLUTE: 1.3 K/UL (ref 1–5.1)
LYMPHOCYTES RELATIVE PERCENT: 30 %
MCH RBC QN AUTO: 31.9 PG (ref 26–34)
MCHC RBC AUTO-ENTMCNC: 33.5 G/DL (ref 31–36)
MCV RBC AUTO: 95.1 FL (ref 80–100)
MONOCYTES ABSOLUTE: 0.4 K/UL (ref 0–1.3)
MONOCYTES RELATIVE PERCENT: 10 %
NEUTROPHILS ABSOLUTE: 2.4 K/UL (ref 1.7–7.7)
NEUTROPHILS RELATIVE PERCENT: 55 %
PDW BLD-RTO: 13.6 % (ref 12.4–15.4)
PLATELET # BLD: 238 K/UL (ref 135–450)
PLATELET SLIDE REVIEW: ADEQUATE
PMV BLD AUTO: 8.1 FL (ref 5–10.5)
RBC # BLD: 3.67 M/UL (ref 4–5.2)
RBC # BLD: NORMAL 10*6/UL
SLIDE REVIEW: ABNORMAL
VITAMIN B-12: 485 PG/ML (ref 211–911)
VITAMIN D 25-HYDROXY: 53.4 NG/ML
WBC # BLD: 4.4 K/UL (ref 4–11)

## 2023-03-03 RX ORDER — LANOLIN ALCOHOL/MO/W.PET/CERES
325 CREAM (GRAM) TOPICAL 2 TIMES DAILY
Qty: 90 TABLET | Refills: 3 | Status: SHIPPED | OUTPATIENT
Start: 2023-03-03

## 2023-03-07 DIAGNOSIS — L50.8 CHRONIC URTICARIA: ICD-10-CM

## 2023-03-07 RX ORDER — HYDROXYZINE HYDROCHLORIDE 10 MG/1
TABLET, FILM COATED ORAL
Qty: 30 TABLET | Refills: 0 | OUTPATIENT
Start: 2023-03-07

## 2023-04-30 DIAGNOSIS — L50.8 CHRONIC URTICARIA: ICD-10-CM

## 2023-04-30 DIAGNOSIS — L29.8 OTHER PRURITUS: ICD-10-CM

## 2023-05-01 RX ORDER — TRIAMCINOLONE ACETONIDE 1 MG/G
CREAM TOPICAL
Qty: 80 G | Refills: 0 | Status: SHIPPED | OUTPATIENT
Start: 2023-05-01

## 2023-08-17 ENCOUNTER — NURSE TRIAGE (OUTPATIENT)
Dept: OTHER | Facility: CLINIC | Age: 28
End: 2023-08-17

## 2023-08-17 ENCOUNTER — TELEPHONE (OUTPATIENT)
Dept: CARDIOLOGY CLINIC | Age: 28
End: 2023-08-17

## 2023-08-17 ENCOUNTER — OFFICE VISIT (OUTPATIENT)
Dept: PRIMARY CARE CLINIC | Age: 28
End: 2023-08-17
Payer: COMMERCIAL

## 2023-08-17 VITALS
SYSTOLIC BLOOD PRESSURE: 139 MMHG | HEART RATE: 76 BPM | TEMPERATURE: 97.1 F | BODY MASS INDEX: 20.66 KG/M2 | WEIGHT: 124 LBS | HEIGHT: 65 IN | OXYGEN SATURATION: 100 % | DIASTOLIC BLOOD PRESSURE: 88 MMHG

## 2023-08-17 DIAGNOSIS — R11.0 NAUSEA: ICD-10-CM

## 2023-08-17 DIAGNOSIS — D50.9 IRON DEFICIENCY ANEMIA, UNSPECIFIED IRON DEFICIENCY ANEMIA TYPE: ICD-10-CM

## 2023-08-17 DIAGNOSIS — Z00.00 HEALTHCARE MAINTENANCE: ICD-10-CM

## 2023-08-17 DIAGNOSIS — R07.89 CHEST PRESSURE: Primary | ICD-10-CM

## 2023-08-17 DIAGNOSIS — R42 EPISODIC LIGHTHEADEDNESS: ICD-10-CM

## 2023-08-17 DIAGNOSIS — R53.83 LOW ENERGY: ICD-10-CM

## 2023-08-17 LAB
25(OH)D3 SERPL-MCNC: 62.5 NG/ML
ALBUMIN SERPL-MCNC: 4.4 G/DL (ref 3.4–5)
ALBUMIN/GLOB SERPL: 1.5 {RATIO} (ref 1.1–2.2)
ALP SERPL-CCNC: 48 U/L (ref 40–129)
ALT SERPL-CCNC: 12 U/L (ref 10–40)
ANION GAP SERPL CALCULATED.3IONS-SCNC: 14 MMOL/L (ref 3–16)
AST SERPL-CCNC: 20 U/L (ref 15–37)
BASOPHILS # BLD: 0 K/UL (ref 0–0.2)
BASOPHILS NFR BLD: 0.2 %
BILIRUB SERPL-MCNC: 0.8 MG/DL (ref 0–1)
BUN SERPL-MCNC: 6 MG/DL (ref 7–20)
CALCIUM SERPL-MCNC: 9.6 MG/DL (ref 8.3–10.6)
CHLORIDE SERPL-SCNC: 99 MMOL/L (ref 99–110)
CO2 SERPL-SCNC: 26 MMOL/L (ref 21–32)
CREAT SERPL-MCNC: 0.8 MG/DL (ref 0.6–1.1)
DEPRECATED RDW RBC AUTO: 13.2 % (ref 12.4–15.4)
EOSINOPHIL # BLD: 0 K/UL (ref 0–0.6)
EOSINOPHIL NFR BLD: 0.2 %
FERRITIN SERPL IA-MCNC: 34.8 NG/ML (ref 15–150)
FOLATE SERPL-MCNC: >20 NG/ML (ref 4.78–24.2)
GFR SERPLBLD CREATININE-BSD FMLA CKD-EPI: >60 ML/MIN/{1.73_M2}
GLUCOSE SERPL-MCNC: 85 MG/DL (ref 70–99)
HCT VFR BLD AUTO: 43 % (ref 36–48)
HCV AB SERPL QL IA: NORMAL
HGB BLD-MCNC: 14.7 G/DL (ref 12–16)
IRON SATN MFR SERPL: 43 % (ref 15–50)
IRON SERPL-MCNC: 190 UG/DL (ref 37–145)
LYMPHOCYTES # BLD: 0.9 K/UL (ref 1–5.1)
LYMPHOCYTES NFR BLD: 10.8 %
MAGNESIUM SERPL-MCNC: 2.3 MG/DL (ref 1.8–2.4)
MCH RBC QN AUTO: 33.5 PG (ref 26–34)
MCHC RBC AUTO-ENTMCNC: 34.2 G/DL (ref 31–36)
MCV RBC AUTO: 98 FL (ref 80–100)
MONOCYTES # BLD: 0.4 K/UL (ref 0–1.3)
MONOCYTES NFR BLD: 4.8 %
NEUTROPHILS # BLD: 7.2 K/UL (ref 1.7–7.7)
NEUTROPHILS NFR BLD: 84 %
PLATELET # BLD AUTO: 264 K/UL (ref 135–450)
PMV BLD AUTO: 8.2 FL (ref 5–10.5)
POTASSIUM SERPL-SCNC: 4.3 MMOL/L (ref 3.5–5.1)
PROT SERPL-MCNC: 7.3 G/DL (ref 6.4–8.2)
RBC # BLD AUTO: 4.39 M/UL (ref 4–5.2)
SODIUM SERPL-SCNC: 139 MMOL/L (ref 136–145)
TIBC SERPL-MCNC: 440 UG/DL (ref 260–445)
TRANSFERRIN SERPL-MCNC: 380 MG/DL (ref 200–360)
TSH SERPL DL<=0.005 MIU/L-ACNC: 1.8 UIU/ML (ref 0.27–4.2)
VIT B12 SERPL-MCNC: 624 PG/ML (ref 211–911)
WBC # BLD AUTO: 8.6 K/UL (ref 4–11)

## 2023-08-17 PROCEDURE — 93246 EXT ECG>7D<15D RECORDING: CPT | Performed by: INTERNAL MEDICINE

## 2023-08-17 PROCEDURE — 99214 OFFICE O/P EST MOD 30 MIN: CPT | Performed by: FAMILY MEDICINE

## 2023-08-17 PROCEDURE — 93000 ELECTROCARDIOGRAM COMPLETE: CPT | Performed by: FAMILY MEDICINE

## 2023-08-17 ASSESSMENT — ENCOUNTER SYMPTOMS
SHORTNESS OF BREATH: 0
COUGH: 0
CONSTIPATION: 0
DIARRHEA: 0
EYE PAIN: 0
ABDOMINAL PAIN: 0

## 2023-08-17 NOTE — PROGRESS NOTES
will experience lightheadedness, sweating, nausea and will sometimes get shaky. It is independent of her activity. Seems worse when she is resting but she states it may be because she is paying more attention. It isnt waking her up from sleep. No new life stressors or increase in anxiety. Denies cough, denies sore throat, denies shortness of breath, denies headaches, denies dizziness. Today she is feeling ok so far, a little fatigued due to lack of PO intake and nausea but no episodes yet. LMP: 8/11/2023, regular and normal for her. H/o iron def anemia. Taking iron daily or every other day. Has been eating small amounts of food for the last few days: crackers, cheese stick, salad, fresh veggies, fruit. Drinking water and tea and a small amount of coffee. Review of Systems   Constitutional:  Positive for fatigue. Negative for appetite change, chills and fever. HENT:  Negative for congestion. Eyes:  Negative for pain and visual disturbance. Respiratory:  Negative for cough and shortness of breath. Cardiovascular:  Positive for chest pain. Negative for palpitations. Gastrointestinal:  Negative for abdominal pain, constipation and diarrhea. Genitourinary:  Negative for difficulty urinating. Musculoskeletal:  Negative for arthralgias. Skin:  Negative for rash and wound. Neurological:  Positive for light-headedness. Negative for dizziness, weakness and headaches. Hematological:  Does not bruise/bleed easily. Psychiatric/Behavioral:  Negative for behavioral problems. History reviewed. No pertinent past medical history. Family History   Problem Relation Age of Onset    High Blood Pressure Mother     No Known Problems Father        Social History     Tobacco Use    Smoking status: Never    Smokeless tobacco: Never   Vaping Use    Vaping Use: Never used   Substance Use Topics    Alcohol use:  Yes     Alcohol/week: 2.0 standard drinks     Types: 2 Glasses of wine per

## 2023-08-17 NOTE — TELEPHONE ENCOUNTER
Received call from [facility] with Red Flag Complaint. Verified, name, , and phone number. Call disconnected. Called patient back. Location of patient: [place]    Subjective: Caller states \" I woke up and had pressure on my chest. My co-worker had COVID. It kept coming and going. Yesterday evening it came back. Today it was still there. I took 3 COVID tests and they were negative. I don't have any other symptoms. It comes and goes. \"     Current Symptoms: generalized chest pressure. Pulse 92 at this time. Decreased appetite. Onset: 5 days ago; intermittent    Associated Symptoms: denies cough, denies sore throat. Pain Severity: 2/10; pressure; intermittent <5 minutes. Episodes can range from few minutes to few hours. Episodes lasted <5 minutes last night. Temperature: denies     What has been tried: 3 negative COVID tests. Ibuprofen, Mucus relief initially without improvement. TheraFlu Tuesday. LMP:  last Friday  Pregnant: No    Recommended disposition: Go to ED/[facility] Now (Or to Office with PCP Approval)    Care advice provided, patient verbalizes understanding; denies any other questions or concerns; instructed to call back for any new or worsening symptoms. [id number]:Called office. Kiki answered the phone. Informed her nurse triage calling with 2nd level. Her response was she didn't see the call rang as nurse triage, she would place this RN on hold and transfer call to [place]. Writer provided warm transfer to [place] at [facility] for 2nd level triage    Attention Provider: Thank you for allowing me to participate in the care of your patient. The patient was connected to triage in response to information provided to the ECC/PSC. Please do not respond through this encounter as the response is not directed to a shared pool.       Reason for Disposition   Chest pain or 'angina' comes and goes and is happening more often (increasing in frequency) or getting worse (increasing

## 2023-08-18 LAB
EST. AVERAGE GLUCOSE BLD GHB EST-MCNC: 99.7 MG/DL
HBA1C MFR BLD: 5.1 %
HIV 1+2 AB+HIV1 P24 AG SERPL QL IA: NORMAL
HIV 2 AB SERPL QL IA: NORMAL
HIV1 AB SERPL QL IA: NORMAL
HIV1 P24 AG SERPL QL IA: NORMAL

## 2023-08-18 SDOH — HEALTH STABILITY: PHYSICAL HEALTH: ON AVERAGE, HOW MANY DAYS PER WEEK DO YOU ENGAGE IN MODERATE TO STRENUOUS EXERCISE (LIKE A BRISK WALK)?: 4 DAYS

## 2023-08-18 SDOH — HEALTH STABILITY: PHYSICAL HEALTH: ON AVERAGE, HOW MANY MINUTES DO YOU ENGAGE IN EXERCISE AT THIS LEVEL?: 40 MIN

## 2023-08-18 ASSESSMENT — SOCIAL DETERMINANTS OF HEALTH (SDOH)

## 2023-08-21 ENCOUNTER — OFFICE VISIT (OUTPATIENT)
Dept: PRIMARY CARE CLINIC | Age: 28
End: 2023-08-21
Payer: COMMERCIAL

## 2023-08-21 VITALS
HEIGHT: 65 IN | HEART RATE: 71 BPM | WEIGHT: 123.8 LBS | DIASTOLIC BLOOD PRESSURE: 88 MMHG | TEMPERATURE: 98.2 F | BODY MASS INDEX: 20.62 KG/M2 | SYSTOLIC BLOOD PRESSURE: 136 MMHG

## 2023-08-21 DIAGNOSIS — D50.9 IRON DEFICIENCY ANEMIA, UNSPECIFIED IRON DEFICIENCY ANEMIA TYPE: ICD-10-CM

## 2023-08-21 DIAGNOSIS — J02.9 PHARYNGITIS, UNSPECIFIED ETIOLOGY: ICD-10-CM

## 2023-08-21 DIAGNOSIS — Z76.89 ENCOUNTER TO ESTABLISH CARE: Primary | ICD-10-CM

## 2023-08-21 PROBLEM — Z98.890 HISTORY OF ANTERIOR CRUCIATE LIGAMENT SURGERY: Status: RESOLVED | Noted: 2023-03-02 | Resolved: 2023-08-21

## 2023-08-21 PROCEDURE — 99395 PREV VISIT EST AGE 18-39: CPT | Performed by: STUDENT IN AN ORGANIZED HEALTH CARE EDUCATION/TRAINING PROGRAM

## 2023-08-21 ASSESSMENT — ENCOUNTER SYMPTOMS
COUGH: 0
SHORTNESS OF BREATH: 0
NAUSEA: 0
VOMITING: 0
DIARRHEA: 0
EYE PAIN: 0
WHEEZING: 0
SINUS PRESSURE: 0
SORE THROAT: 1
RHINORRHEA: 0

## 2023-09-08 DIAGNOSIS — E61.1 IRON DEFICIENCY: ICD-10-CM

## 2023-09-08 RX ORDER — LANOLIN ALCOHOL/MO/W.PET/CERES
CREAM (GRAM) TOPICAL
Qty: 180 TABLET | Refills: 1 | Status: SHIPPED | OUTPATIENT
Start: 2023-09-08

## 2023-09-08 NOTE — TELEPHONE ENCOUNTER
Medication:   Requested Prescriptions     Pending Prescriptions Disp Refills    ferrous sulfate (FE TABS 325) 325 (65 Fe) MG EC tablet [Pharmacy Med Name: FERROUS SULF  MG TABLET] 180 tablet 1     Sig: TAKE 1 TABLET BY MOUTH TWICE A DAY        Last Filled:  03/3/23    Patient Phone Number: [phone] (home)     Last appt: 8/21/2023 Return if symptoms worsen or fail to improve  Next appt: Visit date not found    Last OARRS: No flowsheet data found.

## 2023-09-14 ENCOUNTER — HOSPITAL ENCOUNTER (OUTPATIENT)
Dept: GENERAL RADIOLOGY | Age: 28
Discharge: HOME | End: 2023-09-14
Payer: COMMERCIAL

## 2023-09-14 ENCOUNTER — OFFICE VISIT (OUTPATIENT)
Dept: PRIMARY CARE CLINIC | Age: 28
End: 2023-09-14
Payer: COMMERCIAL

## 2023-09-14 ENCOUNTER — HOSPITAL ENCOUNTER (OUTPATIENT)
Age: 28
Discharge: HOME | End: 2023-09-14
Payer: COMMERCIAL

## 2023-09-14 VITALS
HEIGHT: 65 IN | SYSTOLIC BLOOD PRESSURE: 114 MMHG | BODY MASS INDEX: 20.49 KG/M2 | WEIGHT: 123 LBS | DIASTOLIC BLOOD PRESSURE: 71 MMHG | HEART RATE: 74 BPM | TEMPERATURE: 98.2 F

## 2023-09-14 DIAGNOSIS — R07.89 CHEST PRESSURE: ICD-10-CM

## 2023-09-14 DIAGNOSIS — R53.83 OTHER FATIGUE: ICD-10-CM

## 2023-09-14 DIAGNOSIS — R53.83 OTHER FATIGUE: Primary | ICD-10-CM

## 2023-09-14 LAB — HETEROPH AB BLD QL IA: NEGATIVE

## 2023-09-14 PROCEDURE — 71046 X-RAY EXAM CHEST 2 VIEWS: CPT

## 2023-09-14 PROCEDURE — 99214 OFFICE O/P EST MOD 30 MIN: CPT | Performed by: STUDENT IN AN ORGANIZED HEALTH CARE EDUCATION/TRAINING PROGRAM

## 2023-09-14 RX ORDER — PREDNISONE 10 MG/1
TABLET ORAL
Qty: 40 TABLET | Refills: 0 | Status: SHIPPED | OUTPATIENT
Start: 2023-09-14 | End: 2023-09-29

## 2023-09-14 ASSESSMENT — ENCOUNTER SYMPTOMS
CHEST TIGHTNESS: 1
WHEEZING: 0
SHORTNESS OF BREATH: 0
SORE THROAT: 1
NAUSEA: 0

## 2023-09-14 NOTE — PROGRESS NOTES
erythema. Eyes:      Extraocular Movements: Extraocular movements intact. Pupils: Pupils are equal, round, and reactive to light. Cardiovascular:      Rate and Rhythm: Normal rate and regular rhythm. Pulses: Normal pulses. Heart sounds: Normal heart sounds. No murmur heard. No gallop. Pulmonary:      Effort: Pulmonary effort is normal.      Breath sounds: Normal breath sounds. No wheezing or rales. Abdominal:      General: Abdomen is flat. Palpations: Abdomen is soft. Musculoskeletal:      Right lower leg: No edema. Left lower leg: No edema. Lymphadenopathy:      Cervical: No cervical adenopathy. Skin:     General: Skin is warm and dry. Neurological:      Mental Status: She is alert. Nazanin Humphreys DO    This dictation was generated by voice recognition computer software. Although all attempts are made to edit the dictation for accuracy, there may be errors in the transcription that are not intended.

## 2023-09-15 LAB — ANA SER QL IA: NEGATIVE

## 2023-09-16 LAB
CMV IGG SERPL IA-ACNC: <0.2 U/ML
CMV IGM SERPL IA-ACNC: <8 AU/ML
EBV EA-D IGG SER-ACNC: <5 U/ML (ref 0–10.9)
EBV NA IGG SER IA-ACNC: >600 U/ML (ref 0–21.9)
EBV VCA IGG SER IA-ACNC: 25.6 U/ML (ref 0–21.9)
EBV VCA IGM SER IA-ACNC: 18.1 U/ML (ref 0–43.9)

## 2023-09-18 DIAGNOSIS — R00.2 HEART PALPITATIONS: Primary | ICD-10-CM

## 2023-09-18 PROCEDURE — 93248 EXT ECG>7D<15D REV&INTERPJ: CPT | Performed by: INTERNAL MEDICINE

## 2023-12-05 ENCOUNTER — OFFICE VISIT (OUTPATIENT)
Dept: PRIMARY CARE CLINIC | Age: 28
End: 2023-12-05
Payer: COMMERCIAL

## 2023-12-05 VITALS
HEART RATE: 86 BPM | DIASTOLIC BLOOD PRESSURE: 73 MMHG | HEIGHT: 65 IN | TEMPERATURE: 98 F | SYSTOLIC BLOOD PRESSURE: 121 MMHG | WEIGHT: 129.4 LBS | BODY MASS INDEX: 21.56 KG/M2

## 2023-12-05 DIAGNOSIS — L50.1 CHRONIC IDIOPATHIC URTICARIA: ICD-10-CM

## 2023-12-05 DIAGNOSIS — D50.9 IRON DEFICIENCY ANEMIA, UNSPECIFIED IRON DEFICIENCY ANEMIA TYPE: Primary | ICD-10-CM

## 2023-12-05 PROCEDURE — 99214 OFFICE O/P EST MOD 30 MIN: CPT | Performed by: STUDENT IN AN ORGANIZED HEALTH CARE EDUCATION/TRAINING PROGRAM

## 2023-12-05 RX ORDER — HYDROXYZINE HYDROCHLORIDE 10 MG/1
10 TABLET, FILM COATED ORAL PRN
COMMUNITY

## 2023-12-05 ASSESSMENT — ENCOUNTER SYMPTOMS
SHORTNESS OF BREATH: 0
NAUSEA: 0
WHEEZING: 0

## 2023-12-05 NOTE — PROGRESS NOTES
Influenza, FLUARIX, [facility], [address] (age 10 mo+) AND AFLURIA, (age 1 y+), PF, 0.5mL 09/05/2017, 08/17/2018    Influenza, FLUBLOK, (age 25 y+), PF, 0.5mL 10/16/2019    Influenza, Triv, 3 Years and older, IM (Afluria (5 yrs and older) 10/20/2022    PPD Test 09/06/2017, 09/13/2017, 08/14/2018, 08/27/2018       Health Maintenance   Topic Date Due    Hepatitis B vaccine (1 of 3 - 3-dose series) Never done    DTaP/Tdap/Td vaccine (1 - Tdap) Never done    COVID-19 Vaccine (3 - 2023-24 season) 09/01/2023    Depression Screen  03/02/2024    Pap smear  12/05/2025    Flu vaccine  Completed    Hepatitis C screen  Completed    HIV screen  Completed    Hepatitis A vaccine  Aged Out    Hib vaccine  Aged Out    HPV vaccine  Aged Out    Meningococcal (ACWY) vaccine  Aged Out    Pneumococcal 0-64 years Vaccine  Aged Out    Varicella vaccine  Discontinued       PSH, PMH, SH and FH reviewed and noted. Recent and past labs, tests and consults also reviewed. Recent or new meds also reviewed. Nick Corona,     This dictation was generated by voice recognition computer software. Although all attempts are made to edit the dictation for accuracy, there may be errors in the transcription that are not intended.

## 2023-12-08 ENCOUNTER — E-VISIT (OUTPATIENT)
Dept: PRIMARY CARE CLINIC | Age: 28
End: 2023-12-08
Payer: COMMERCIAL

## 2023-12-08 DIAGNOSIS — J06.9 UPPER RESPIRATORY TRACT INFECTION, UNSPECIFIED TYPE: Primary | ICD-10-CM

## 2023-12-08 PROCEDURE — 99422 OL DIG E/M SVC 11-20 MIN: CPT | Performed by: NURSE PRACTITIONER

## 2023-12-08 RX ORDER — AMOXICILLIN AND CLAVULANATE POTASSIUM 875; 125 MG/1; MG/1
1 TABLET, FILM COATED ORAL 2 TIMES DAILY
Qty: 20 TABLET | Refills: 0 | Status: SHIPPED | OUTPATIENT
Start: 2023-12-08 | End: 2023-12-18

## 2023-12-08 ASSESSMENT — LIFESTYLE VARIABLES: SMOKING_STATUS: NO, I'VE NEVER SMOKED

## 2023-12-08 NOTE — PROGRESS NOTES
Guadalupe Garvey (1995) initiated an asynchronous digital communication through [employer]. HPI: per patient questionnaire     Exam: not applicable    Diagnoses and all orders for this visit:  Diagnoses and all orders for this visit:    Upper respiratory tract infection, unspecified type    Other orders  -     amoxicillin-clavulanate (AUGMENTIN) 875-125 MG per tablet; Take 1 tablet by mouth 2 times daily for 10 days    Antibiotics sent. Supportive care measures sent. F/u with pcp as needed    Time: EV2 - 11-20 minutes were spent on the digital evaluation and management of this patient.  18 min     Bree Jordan, APRN - CNP

## 2023-12-12 ENCOUNTER — OFFICE VISIT (OUTPATIENT)
Dept: PRIMARY CARE CLINIC | Age: 28
End: 2023-12-12
Payer: COMMERCIAL

## 2023-12-12 VITALS
SYSTOLIC BLOOD PRESSURE: 136 MMHG | WEIGHT: 129 LBS | BODY MASS INDEX: 21.49 KG/M2 | HEART RATE: 98 BPM | HEIGHT: 65 IN | DIASTOLIC BLOOD PRESSURE: 78 MMHG | TEMPERATURE: 98.4 F

## 2023-12-12 DIAGNOSIS — R13.12 OROPHARYNGEAL DYSPHAGIA: Primary | ICD-10-CM

## 2023-12-12 DIAGNOSIS — J34.89 NASAL TENDERNESS: ICD-10-CM

## 2023-12-12 DIAGNOSIS — H92.02 LEFT EAR PAIN: ICD-10-CM

## 2023-12-12 PROCEDURE — 99214 OFFICE O/P EST MOD 30 MIN: CPT | Performed by: NURSE PRACTITIONER

## 2023-12-12 RX ORDER — PREDNISONE 20 MG/1
40 TABLET ORAL DAILY
Qty: 10 TABLET | Refills: 0 | Status: SHIPPED | OUTPATIENT
Start: 2023-12-12 | End: 2023-12-17

## 2023-12-12 ASSESSMENT — ENCOUNTER SYMPTOMS
COUGH: 0
TROUBLE SWALLOWING: 1
RHINORRHEA: 1
SORE THROAT: 0
VOICE CHANGE: 0
ABDOMINAL PAIN: 0
SHORTNESS OF BREATH: 0
SINUS PAIN: 0
SINUS PRESSURE: 0
FACIAL SWELLING: 1

## 2023-12-12 NOTE — PROGRESS NOTES
[address] [facility]. PRIMARY CARE  [address] [facility] [address]  Dept: [phone]  Dept Fax: [phone]     12/12/2023      Adalgisa Brady   1995     Chief Complaint   Patient presents with    Sinusitis     Sore nose,tender and sore left ear pain, feels swollen when she swallows       HPI     Patient states she had intermittent pain in left ear for the past couple of months. Last week she started to have cold symptoms- sinus pressure, rhinorrhea, headache, sore throat. She did an e-visit on 12/8 and was started on Augmentin at the time which she is still taking. Her original symptoms have mostly resolved but now she is feeling like nose is sore to touch and feels swollen. She states throat is no longer sore but it feels like food is harder to swallow. She states last year she had similar symptoms where she was having trouble swallowing. She states Dr. Aiden Jordan gave her referral to GI but she states symptoms weren't too frequent so she didn't end up making appt. Last night she was eating dinner and felt like food got stuck in throat and she could not swallow it. She drank some water and it did end up going down. 3/2/2023     1:31 PM 9/21/2022     4:03 PM 12/6/2021     3:07 PM   PHQ Scores   PHQ2 Score 0 0 0   PHQ9 Score 0 0 0     Interpretation of Total Score Depression Severity: 1-4 = Minimal depression, 5-9 = Mild depression, 10-14 = Moderate depression, 15-19 = Moderately severe depression, 20-27 = Severe depression     Prior to Visit Medications    Medication Sig Taking?  Authorizing Provider   amoxicillin-clavulanate (AUGMENTIN) 875-125 MG per tablet Take 1 tablet by mouth 2 times daily for 10 days Yes NABEEL Herring CNP   hydrOXYzine HCl (ATARAX) 10 MG tablet Take 1 tablet by mouth as needed for Itching Yes Provider, MD Rossy   ferrous sulfate (FE TABS 325) 325 (65 Fe) MG EC tablet TAKE 1 TABLET BY MOUTH TWICE A DAY Yes Christy Francisco, DO

## 2024-03-07 DIAGNOSIS — E61.1 IRON DEFICIENCY: ICD-10-CM

## 2024-03-07 NOTE — TELEPHONE ENCOUNTER
Medication:   Requested Prescriptions     Pending Prescriptions Disp Refills    ferrous sulfate (FE TABS 325) 325 (65 Fe) MG EC tablet [Pharmacy Med Name: FERROUS SULF  MG TABLET] 180 tablet 1     Sig: TAKE 1 TABLET BY MOUTH TWICE A DAY        Last Filled:  9/8/23    Patient Phone Number: [phone] (home)     Last appt: 12/12/2023   Next appt: Visit date not found    Last OARRS:        No data to display

## 2024-03-08 RX ORDER — LANOLIN ALCOHOL/MO/W.PET/CERES
CREAM (GRAM) TOPICAL
Qty: 180 TABLET | Refills: 1 | Status: SHIPPED | OUTPATIENT
Start: 2024-03-08

## 2024-08-12 DIAGNOSIS — L29.8 OTHER PRURITUS: ICD-10-CM

## 2024-08-12 DIAGNOSIS — L50.8 CHRONIC URTICARIA: ICD-10-CM

## 2024-08-12 RX ORDER — TRIAMCINOLONE ACETONIDE 1 MG/G
CREAM TOPICAL
Qty: 80 G | Refills: 0 | OUTPATIENT
Start: 2024-08-12

## 2024-12-05 ENCOUNTER — OFFICE VISIT (OUTPATIENT)
Dept: PRIMARY CARE CLINIC | Age: 29
End: 2024-12-05
Payer: COMMERCIAL

## 2024-12-05 VITALS
BODY MASS INDEX: 21.09 KG/M2 | SYSTOLIC BLOOD PRESSURE: 113 MMHG | DIASTOLIC BLOOD PRESSURE: 76 MMHG | HEART RATE: 76 BPM | TEMPERATURE: 97.2 F | WEIGHT: 126.6 LBS | HEIGHT: 65 IN | OXYGEN SATURATION: 99 %

## 2024-12-05 DIAGNOSIS — Z00.00 ANNUAL PHYSICAL EXAM: Primary | ICD-10-CM

## 2024-12-05 PROCEDURE — 99395 PREV VISIT EST AGE 18-39: CPT | Performed by: STUDENT IN AN ORGANIZED HEALTH CARE EDUCATION/TRAINING PROGRAM

## 2024-12-05 RX ORDER — MULTIVITAMIN WITH IRON
1 TABLET ORAL DAILY
COMMUNITY

## 2024-12-05 SDOH — ECONOMIC STABILITY: FOOD INSECURITY: WITHIN THE PAST 12 MONTHS, THE FOOD YOU BOUGHT JUST DIDN'T LAST AND YOU DIDN'T HAVE MONEY TO GET MORE.: NEVER TRUE

## 2024-12-05 SDOH — ECONOMIC STABILITY: FOOD INSECURITY: WITHIN THE PAST 12 MONTHS, YOU WORRIED THAT YOUR FOOD WOULD RUN OUT BEFORE YOU GOT MONEY TO BUY MORE.: NEVER TRUE

## 2024-12-05 SDOH — ECONOMIC STABILITY: INCOME INSECURITY: HOW HARD IS IT FOR YOU TO PAY FOR THE VERY BASICS LIKE FOOD, HOUSING, MEDICAL CARE, AND HEATING?: NOT HARD AT ALL

## 2024-12-05 ASSESSMENT — PATIENT HEALTH QUESTIONNAIRE - PHQ9
1. LITTLE INTEREST OR PLEASURE IN DOING THINGS: NOT AT ALL
6. FEELING BAD ABOUT YOURSELF - OR THAT YOU ARE A FAILURE OR HAVE LET YOURSELF OR YOUR FAMILY DOWN: NOT AT ALL
3. TROUBLE FALLING OR STAYING ASLEEP: NOT AT ALL
SUM OF ALL RESPONSES TO PHQ9 QUESTIONS 1 & 2: 0
5. POOR APPETITE OR OVEREATING: NOT AT ALL
7. TROUBLE CONCENTRATING ON THINGS, SUCH AS READING THE NEWSPAPER OR WATCHING TELEVISION: NOT AT ALL
SUM OF ALL RESPONSES TO PHQ QUESTIONS 1-9: 0
2. FEELING DOWN, DEPRESSED OR HOPELESS: NOT AT ALL
SUM OF ALL RESPONSES TO PHQ QUESTIONS 1-9: 0
4. FEELING TIRED OR HAVING LITTLE ENERGY: NOT AT ALL
8. MOVING OR SPEAKING SO SLOWLY THAT OTHER PEOPLE COULD HAVE NOTICED. OR THE OPPOSITE, BEING SO FIGETY OR RESTLESS THAT YOU HAVE BEEN MOVING AROUND A LOT MORE THAN USUAL: NOT AT ALL
9. THOUGHTS THAT YOU WOULD BE BETTER OFF DEAD, OR OF HURTING YOURSELF: NOT AT ALL
10. IF YOU CHECKED OFF ANY PROBLEMS, HOW DIFFICULT HAVE THESE PROBLEMS MADE IT FOR YOU TO DO YOUR WORK, TAKE CARE OF THINGS AT HOME, OR GET ALONG WITH OTHER PEOPLE: NOT DIFFICULT AT ALL

## 2024-12-05 ASSESSMENT — ENCOUNTER SYMPTOMS
COUGH: 0
DIARRHEA: 0
CONSTIPATION: 0
SHORTNESS OF BREATH: 0

## 2024-12-05 NOTE — PROGRESS NOTES
TABLET BY MOUTH TWICE A  tablet 1    hydrOXYzine HCl (ATARAX) 10 MG tablet Take 1 tablet by mouth as needed for Itching      triamcinolone (KENALOG) 0.1 % cream APPLY TO AFFECTED ITCHY AREAS OF SKIN TWICE DAILY FOR UP TO 2 WEEKS PER MONTH OR UNTIL IMPROVED. 80 g 0    drospirenone-ethinyl estradiol (ADDISON) 3-0.02 MG per tablet       cetirizine (ZYRTEC) 10 MG tablet TAKE UP TO 4 TABLETS DAILY FOR CHRONIC URTICARIA 120 tablet 1     No current facility-administered medications on file prior to visit.      Past Medical History:   Diagnosis Date    History of anterior cruciate ligament surgery 3/2/2023     Patient Active Problem List   Diagnosis    Iron deficiency anemia    Chronic idiopathic urticaria       PE  Vitals:    12/05/24 0659   BP: 113/76   Pulse: 76   Temp: 97.2 °F (36.2 °C)   TempSrc: Temporal   SpO2: 99%   Weight: 57.4 kg (126 lb 9.6 oz)   Height: 1.651 m (5' 5\")     Estimated body mass index is 21.07 kg/m² as calculated from the following:    Height as of this encounter: 1.651 m (5' 5\").    Weight as of this encounter: 57.4 kg (126 lb 9.6 oz).    Physical Exam  Vitals reviewed.   Constitutional:       Appearance: Normal appearance.   HENT:      Head: Normocephalic and atraumatic.      Right Ear: Tympanic membrane normal.      Left Ear: Tympanic membrane normal.      Nose: Nose normal.      Mouth/Throat:      Mouth: Mucous membranes are moist.      Pharynx: Oropharynx is clear.   Eyes:      Conjunctiva/sclera: Conjunctivae normal.      Pupils: Pupils are equal, round, and reactive to light.   Cardiovascular:      Rate and Rhythm: Normal rate and regular rhythm.      Pulses: Normal pulses.      Heart sounds: Normal heart sounds.   Pulmonary:      Effort: Pulmonary effort is normal.      Breath sounds: Normal breath sounds.   Abdominal:      General: Abdomen is flat.      Palpations: Abdomen is soft.   Musculoskeletal:         General: Normal range of motion.      Cervical back: Normal range of motion and

## 2025-08-13 ENCOUNTER — OFFICE VISIT (OUTPATIENT)
Dept: PRIMARY CARE CLINIC | Age: 30
End: 2025-08-13

## 2025-08-13 VITALS
SYSTOLIC BLOOD PRESSURE: 122 MMHG | HEART RATE: 70 BPM | BODY MASS INDEX: 20.03 KG/M2 | WEIGHT: 120.2 LBS | HEIGHT: 65 IN | TEMPERATURE: 98.1 F | DIASTOLIC BLOOD PRESSURE: 83 MMHG

## 2025-08-13 DIAGNOSIS — Z00.00 ANNUAL PHYSICAL EXAM: Primary | ICD-10-CM

## 2025-08-13 DIAGNOSIS — Z00.00 ANNUAL PHYSICAL EXAM: ICD-10-CM

## 2025-08-13 DIAGNOSIS — R59.0 ANTERIOR CERVICAL LYMPHADENOPATHY: ICD-10-CM

## 2025-08-13 SDOH — ECONOMIC STABILITY: FOOD INSECURITY: WITHIN THE PAST 12 MONTHS, YOU WORRIED THAT YOUR FOOD WOULD RUN OUT BEFORE YOU GOT MONEY TO BUY MORE.: NEVER TRUE

## 2025-08-13 SDOH — ECONOMIC STABILITY: FOOD INSECURITY: WITHIN THE PAST 12 MONTHS, THE FOOD YOU BOUGHT JUST DIDN'T LAST AND YOU DIDN'T HAVE MONEY TO GET MORE.: NEVER TRUE

## 2025-08-13 ASSESSMENT — ENCOUNTER SYMPTOMS
CONSTIPATION: 0
DIARRHEA: 0
SHORTNESS OF BREATH: 0
COUGH: 0

## 2025-08-14 LAB
ALBUMIN SERPL-MCNC: 4.7 G/DL (ref 3.4–5)
ALBUMIN/GLOB SERPL: 2 {RATIO} (ref 1.1–2.2)
ALP SERPL-CCNC: 58 U/L (ref 40–129)
ALT SERPL-CCNC: 14 U/L (ref 10–40)
ANION GAP SERPL CALCULATED.3IONS-SCNC: 13 MMOL/L (ref 3–16)
AST SERPL-CCNC: 21 U/L (ref 15–37)
BASOPHILS # BLD: 0 K/UL (ref 0–0.2)
BASOPHILS NFR BLD: 0.4 %
BILIRUB SERPL-MCNC: 1.3 MG/DL (ref 0–1)
BUN SERPL-MCNC: 8 MG/DL (ref 7–20)
CALCIUM SERPL-MCNC: 9.7 MG/DL (ref 8.3–10.6)
CHLORIDE SERPL-SCNC: 99 MMOL/L (ref 99–110)
CHOLEST SERPL-MCNC: 202 MG/DL (ref 0–199)
CO2 SERPL-SCNC: 25 MMOL/L (ref 21–32)
CREAT SERPL-MCNC: 0.8 MG/DL (ref 0.6–1.1)
DEPRECATED RDW RBC AUTO: 12.5 % (ref 12.4–15.4)
EOSINOPHIL # BLD: 0 K/UL (ref 0–0.6)
EOSINOPHIL NFR BLD: 0.6 %
EST. AVERAGE GLUCOSE BLD GHB EST-MCNC: 96.8 MG/DL
GFR SERPLBLD CREATININE-BSD FMLA CKD-EPI: >90 ML/MIN/{1.73_M2}
GLUCOSE SERPL-MCNC: 81 MG/DL (ref 70–99)
HBA1C MFR BLD: 5 %
HCT VFR BLD AUTO: 40.5 % (ref 36–48)
HDLC SERPL-MCNC: 74 MG/DL (ref 40–60)
HGB BLD-MCNC: 14.1 G/DL (ref 12–16)
LDLC SERPL CALC-MCNC: 118 MG/DL
LYMPHOCYTES # BLD: 1.5 K/UL (ref 1–5.1)
LYMPHOCYTES NFR BLD: 37 %
MCH RBC QN AUTO: 34.5 PG (ref 26–34)
MCHC RBC AUTO-ENTMCNC: 34.8 G/DL (ref 31–36)
MCV RBC AUTO: 99.1 FL (ref 80–100)
MONOCYTES # BLD: 0.3 K/UL (ref 0–1.3)
MONOCYTES NFR BLD: 7.3 %
NEUTROPHILS # BLD: 2.2 K/UL (ref 1.7–7.7)
NEUTROPHILS NFR BLD: 54.7 %
PLATELET # BLD AUTO: 239 K/UL (ref 135–450)
PMV BLD AUTO: 8.4 FL (ref 5–10.5)
POTASSIUM SERPL-SCNC: 3.8 MMOL/L (ref 3.5–5.1)
PROT SERPL-MCNC: 7 G/DL (ref 6.4–8.2)
RBC # BLD AUTO: 4.08 M/UL (ref 4–5.2)
SODIUM SERPL-SCNC: 137 MMOL/L (ref 136–145)
TRIGL SERPL-MCNC: 48 MG/DL (ref 0–150)
TSH SERPL DL<=0.005 MIU/L-ACNC: 1.34 UIU/ML (ref 0.27–4.2)
VLDLC SERPL CALC-MCNC: 10 MG/DL
WBC # BLD AUTO: 4 K/UL (ref 4–11)